# Patient Record
Sex: MALE | Race: WHITE | NOT HISPANIC OR LATINO | Employment: OTHER | URBAN - NONMETROPOLITAN AREA
[De-identification: names, ages, dates, MRNs, and addresses within clinical notes are randomized per-mention and may not be internally consistent; named-entity substitution may affect disease eponyms.]

---

## 2023-05-31 ENCOUNTER — APPOINTMENT (OUTPATIENT)
Dept: GENERAL RADIOLOGY | Facility: HOSPITAL | Age: 58
End: 2023-05-31
Payer: MEDICARE

## 2023-05-31 ENCOUNTER — APPOINTMENT (OUTPATIENT)
Dept: CT IMAGING | Facility: HOSPITAL | Age: 58
End: 2023-05-31
Payer: MEDICARE

## 2023-05-31 ENCOUNTER — HOSPITAL ENCOUNTER (EMERGENCY)
Facility: HOSPITAL | Age: 58
Discharge: HOME | End: 2023-05-31
Attending: EMERGENCY MEDICINE | Admitting: FAMILY MEDICINE
Payer: MEDICARE

## 2023-05-31 ENCOUNTER — APPOINTMENT (OUTPATIENT)
Dept: ULTRASOUND IMAGING | Facility: HOSPITAL | Age: 58
End: 2023-05-31
Payer: MEDICARE

## 2023-05-31 VITALS
WEIGHT: 196.6 LBS | OXYGEN SATURATION: 97 % | BODY MASS INDEX: 31.6 KG/M2 | SYSTOLIC BLOOD PRESSURE: 146 MMHG | TEMPERATURE: 98 F | HEART RATE: 88 BPM | DIASTOLIC BLOOD PRESSURE: 78 MMHG | HEIGHT: 66 IN | RESPIRATION RATE: 20 BRPM

## 2023-05-31 DIAGNOSIS — K80.20 CALCULUS OF GALLBLADDER WITHOUT CHOLECYSTITIS WITHOUT OBSTRUCTION: ICD-10-CM

## 2023-05-31 DIAGNOSIS — R10.11 RIGHT UPPER QUADRANT ABDOMINAL PAIN: Primary | ICD-10-CM

## 2023-05-31 DIAGNOSIS — K80.50 BILIARY COLIC: ICD-10-CM

## 2023-05-31 DIAGNOSIS — K82.8 GALLBLADDER SLUDGE: ICD-10-CM

## 2023-05-31 LAB
ALBUMIN SERPL-MCNC: 4.2 G/DL (ref 3.5–5.2)
ALBUMIN/GLOB SERPL: 1.8 G/DL
ALP SERPL-CCNC: 97 U/L (ref 39–117)
ALT SERPL W P-5'-P-CCNC: 15 U/L (ref 1–41)
ANION GAP SERPL CALCULATED.3IONS-SCNC: 11 MMOL/L (ref 5–15)
APTT PPP: 31.7 SECONDS (ref 24.1–35)
AST SERPL-CCNC: 17 U/L (ref 1–40)
BASOPHILS # BLD AUTO: 0.07 10*3/MM3 (ref 0–0.2)
BASOPHILS NFR BLD AUTO: 0.5 % (ref 0–1.5)
BILIRUB SERPL-MCNC: 0.7 MG/DL (ref 0–1.2)
BILIRUB UR QL STRIP: NEGATIVE
BUN SERPL-MCNC: 16 MG/DL (ref 6–20)
BUN/CREAT SERPL: 16.7 (ref 7–25)
CALCIUM SPEC-SCNC: 8.9 MG/DL (ref 8.6–10.5)
CHLORIDE SERPL-SCNC: 106 MMOL/L (ref 98–107)
CLARITY UR: CLEAR
CO2 SERPL-SCNC: 26 MMOL/L (ref 22–29)
COLOR UR: YELLOW
CREAT SERPL-MCNC: 0.96 MG/DL (ref 0.76–1.27)
D-LACTATE SERPL-SCNC: 2.4 MMOL/L (ref 0.5–2)
D-LACTATE SERPL-SCNC: 2.6 MMOL/L (ref 0.5–2)
DEPRECATED RDW RBC AUTO: 54.4 FL (ref 37–54)
EGFRCR SERPLBLD CKD-EPI 2021: 93.3 ML/MIN/1.73
EOSINOPHIL # BLD AUTO: 0.47 10*3/MM3 (ref 0–0.4)
EOSINOPHIL NFR BLD AUTO: 3 % (ref 0.3–6.2)
ERYTHROCYTE [DISTWIDTH] IN BLOOD BY AUTOMATED COUNT: 18.5 % (ref 12.3–15.4)
ETHANOL UR QL: 0.22 %
GEN 5 2HR TROPONIN T REFLEX: 29 NG/L
GLOBULIN UR ELPH-MCNC: 2.4 GM/DL
GLUCOSE SERPL-MCNC: 90 MG/DL (ref 65–99)
GLUCOSE UR STRIP-MCNC: NEGATIVE MG/DL
HCT VFR BLD AUTO: 41.5 % (ref 37.5–51)
HGB BLD-MCNC: 12.8 G/DL (ref 13–17.7)
HGB UR QL STRIP.AUTO: NEGATIVE
IMM GRANULOCYTES # BLD AUTO: 0.05 10*3/MM3 (ref 0–0.05)
IMM GRANULOCYTES NFR BLD AUTO: 0.3 % (ref 0–0.5)
INR PPP: 0.95 (ref 0.91–1.09)
KETONES UR QL STRIP: NEGATIVE
LEUKOCYTE ESTERASE UR QL STRIP.AUTO: NEGATIVE
LIPASE SERPL-CCNC: 18 U/L (ref 13–60)
LYMPHOCYTES # BLD AUTO: 2.06 10*3/MM3 (ref 0.7–3.1)
LYMPHOCYTES NFR BLD AUTO: 13.2 % (ref 19.6–45.3)
MCH RBC QN AUTO: 25.9 PG (ref 26.6–33)
MCHC RBC AUTO-ENTMCNC: 30.8 G/DL (ref 31.5–35.7)
MCV RBC AUTO: 83.8 FL (ref 79–97)
MONOCYTES # BLD AUTO: 1.02 10*3/MM3 (ref 0.1–0.9)
MONOCYTES NFR BLD AUTO: 6.6 % (ref 5–12)
NEUTROPHILS NFR BLD AUTO: 11.88 10*3/MM3 (ref 1.7–7)
NEUTROPHILS NFR BLD AUTO: 76.4 % (ref 42.7–76)
NITRITE UR QL STRIP: NEGATIVE
NRBC BLD AUTO-RTO: 0 /100 WBC (ref 0–0.2)
PH UR STRIP.AUTO: 5.5 [PH] (ref 5–8)
PLATELET # BLD AUTO: 344 10*3/MM3 (ref 140–450)
PMV BLD AUTO: 9.2 FL (ref 6–12)
POTASSIUM SERPL-SCNC: 4.3 MMOL/L (ref 3.5–5.2)
PROT SERPL-MCNC: 6.6 G/DL (ref 6–8.5)
PROT UR QL STRIP: NEGATIVE
PROTHROMBIN TIME: 12.7 SECONDS (ref 11.8–14.8)
RBC # BLD AUTO: 4.95 10*6/MM3 (ref 4.14–5.8)
SODIUM SERPL-SCNC: 143 MMOL/L (ref 136–145)
SP GR UR STRIP: 1.02 (ref 1–1.03)
TROPONIN T DELTA: -2 NG/L
TROPONIN T SERPL HS-MCNC: 31 NG/L
UROBILINOGEN UR QL STRIP: NORMAL
WBC NRBC COR # BLD: 15.55 10*3/MM3 (ref 3.4–10.8)

## 2023-05-31 PROCEDURE — 71045 X-RAY EXAM CHEST 1 VIEW: CPT

## 2023-05-31 PROCEDURE — 81003 URINALYSIS AUTO W/O SCOPE: CPT | Performed by: PHYSICIAN ASSISTANT

## 2023-05-31 PROCEDURE — 99284 EMERGENCY DEPT VISIT MOD MDM: CPT

## 2023-05-31 PROCEDURE — 84484 ASSAY OF TROPONIN QUANT: CPT | Performed by: PHYSICIAN ASSISTANT

## 2023-05-31 PROCEDURE — 25510000001 IOPAMIDOL 61 % SOLUTION: Performed by: PHYSICIAN ASSISTANT

## 2023-05-31 PROCEDURE — 83605 ASSAY OF LACTIC ACID: CPT | Performed by: PHYSICIAN ASSISTANT

## 2023-05-31 PROCEDURE — 85730 THROMBOPLASTIN TIME PARTIAL: CPT | Performed by: PHYSICIAN ASSISTANT

## 2023-05-31 PROCEDURE — 36415 COLL VENOUS BLD VENIPUNCTURE: CPT

## 2023-05-31 PROCEDURE — 83690 ASSAY OF LIPASE: CPT | Performed by: PHYSICIAN ASSISTANT

## 2023-05-31 PROCEDURE — 25010000002 THIAMINE PER 100 MG: Performed by: PHYSICIAN ASSISTANT

## 2023-05-31 PROCEDURE — 96365 THER/PROPH/DIAG IV INF INIT: CPT

## 2023-05-31 PROCEDURE — 85025 COMPLETE CBC W/AUTO DIFF WBC: CPT | Performed by: PHYSICIAN ASSISTANT

## 2023-05-31 PROCEDURE — 74177 CT ABD & PELVIS W/CONTRAST: CPT

## 2023-05-31 PROCEDURE — 76705 ECHO EXAM OF ABDOMEN: CPT

## 2023-05-31 PROCEDURE — 93005 ELECTROCARDIOGRAM TRACING: CPT | Performed by: PHYSICIAN ASSISTANT

## 2023-05-31 PROCEDURE — 96367 TX/PROPH/DG ADDL SEQ IV INF: CPT

## 2023-05-31 PROCEDURE — 96361 HYDRATE IV INFUSION ADD-ON: CPT

## 2023-05-31 PROCEDURE — 80053 COMPREHEN METABOLIC PANEL: CPT | Performed by: PHYSICIAN ASSISTANT

## 2023-05-31 PROCEDURE — 82077 ASSAY SPEC XCP UR&BREATH IA: CPT | Performed by: PHYSICIAN ASSISTANT

## 2023-05-31 PROCEDURE — 96375 TX/PRO/DX INJ NEW DRUG ADDON: CPT

## 2023-05-31 PROCEDURE — 85610 PROTHROMBIN TIME: CPT | Performed by: PHYSICIAN ASSISTANT

## 2023-05-31 RX ORDER — KETOROLAC TROMETHAMINE 10 MG/1
10 TABLET, FILM COATED ORAL EVERY 6 HOURS PRN
Qty: 9 TABLET | Refills: 0 | Status: SHIPPED | OUTPATIENT
Start: 2023-05-31

## 2023-05-31 RX ORDER — SODIUM CHLORIDE, SODIUM LACTATE, POTASSIUM CHLORIDE, CALCIUM CHLORIDE 600; 310; 30; 20 MG/100ML; MG/100ML; MG/100ML; MG/100ML
125 INJECTION, SOLUTION INTRAVENOUS CONTINUOUS
Status: DISCONTINUED | OUTPATIENT
Start: 2023-05-31 | End: 2023-05-31 | Stop reason: HOSPADM

## 2023-05-31 RX ORDER — SODIUM CHLORIDE 0.9 % (FLUSH) 0.9 %
10 SYRINGE (ML) INJECTION AS NEEDED
Status: DISCONTINUED | OUTPATIENT
Start: 2023-05-31 | End: 2023-05-31 | Stop reason: HOSPADM

## 2023-05-31 RX ORDER — FAMOTIDINE 10 MG/ML
20 INJECTION, SOLUTION INTRAVENOUS ONCE
Status: COMPLETED | OUTPATIENT
Start: 2023-05-31 | End: 2023-05-31

## 2023-05-31 RX ADMIN — THIAMINE HYDROCHLORIDE 100 MG: 100 INJECTION, SOLUTION INTRAMUSCULAR; INTRAVENOUS at 17:46

## 2023-05-31 RX ADMIN — FOLIC ACID 1 MG: 5 INJECTION, SOLUTION INTRAMUSCULAR; INTRAVENOUS; SUBCUTANEOUS at 18:07

## 2023-05-31 RX ADMIN — FAMOTIDINE 20 MG: 10 INJECTION INTRAVENOUS at 17:45

## 2023-05-31 RX ADMIN — SODIUM CHLORIDE, POTASSIUM CHLORIDE, SODIUM LACTATE AND CALCIUM CHLORIDE 125 ML/HR: 600; 310; 30; 20 INJECTION, SOLUTION INTRAVENOUS at 19:18

## 2023-05-31 RX ADMIN — IOPAMIDOL 100 ML: 612 INJECTION, SOLUTION INTRAVENOUS at 16:53

## 2023-05-31 RX ADMIN — SODIUM CHLORIDE, POTASSIUM CHLORIDE, SODIUM LACTATE AND CALCIUM CHLORIDE 1000 ML: 600; 310; 30; 20 INJECTION, SOLUTION INTRAVENOUS at 17:44

## 2023-05-31 NOTE — ED PROVIDER NOTES
"Subjective   History of Present Illness    Patient is a pleasant 57-year-old gentleman who presents to ED with wife.  Chief complaint is right-sided abdominal pain.  Wife does provide some history as well.  She describes that he is complained of discomfort in the right upper quadrant last evening.  Initially was somewhat tolerable.  He describes as \"it feels like something is trying to push out.\"  He denies experiencing pain like this before but he does experience chronic pain in other areas.  He denies any associated fever but does complain some nausea without any vomiting.  He reports he is an alcoholic and drinks regularly.  He drinks as much as he could afford.  He had at least a couple pints of rum today with the last alcohol intake an hour prior to ER arrival and after the pain had already began.  He denies any worsening pain with alcohol.  He denies any dysuria, hematuria, flank pain.  He denies any change in bowel movement.  His gallbladder is still intact.  He has had ulcers in the past and EGDs all presenting differently.  He also has a history of kidney stones that presented differently.  He denies any flank pain he has any trauma.    Patient does have slurred speech but the wife reports the patient has a history of stroke and had slurred speech since his stroke.  Also, he has been drinking alcohol which slurred speech.  She reports that this is normal and she is not surprised by this.  He denies any other neurological deficits.    Review of Systems   Constitutional:  Positive for activity change.   HENT: Negative.     Eyes: Negative.    Respiratory: Negative.  Negative for chest tightness.    Cardiovascular: Negative.    Gastrointestinal:  Positive for abdominal pain and nausea. Negative for constipation, diarrhea and vomiting.   Genitourinary: Negative.    Musculoskeletal: Negative.    Skin: Negative.    Neurological: Negative.    Psychiatric/Behavioral: Negative.     All other systems reviewed and are " "negative.    History reviewed. No pertinent past medical history.    Allergies   Allergen Reactions    Prozac [Fluoxetine] Unknown - Low Severity     Chest pain        Past Surgical History:   Procedure Laterality Date    HERNIA REPAIR         History reviewed. No pertinent family history.    Social History     Socioeconomic History    Marital status:    Substance and Sexual Activity    Alcohol use: Yes       Prior to Admission medications    Not on File       Medications   sodium chloride 0.9 % flush 10 mL (has no administration in time range)   thiamine (B-1) 100 mg in sodium chloride 0.9 % 100 mL IVPB (0 mg Intravenous Stopped 5/31/23 1807)   lactated ringers infusion (125 mL/hr Intravenous New Bag 5/31/23 1918)   folic acid 1 mg in sodium chloride 0.9 % 50 mL IVPB (0 mg Intravenous Stopped 5/31/23 1917)   famotidine (PEPCID) injection 20 mg (20 mg Intravenous Given 5/31/23 1745)   lactated ringers bolus 1,000 mL (0 mL Intravenous Stopped 5/31/23 1917)   iopamidol (ISOVUE-300) 61 % injection 100 mL (100 mL Intravenous Given 5/31/23 1653)       /78   Pulse 88   Temp 98 °F (36.7 °C)   Resp 20   Ht 167.6 cm (66\")   Wt 89.2 kg (196 lb 9.6 oz)   SpO2 97%   BMI 31.73 kg/m²       Objective   Physical Exam  Vitals reviewed.   Constitutional:       Appearance: He is well-developed.   HENT:      Head: Normocephalic and atraumatic.      Right Ear: External ear normal.      Left Ear: External ear normal.      Nose: Nose normal.   Eyes:      Conjunctiva/sclera: Conjunctivae normal.      Pupils: Pupils are equal, round, and reactive to light.   Neck:      Trachea: No tracheal deviation.   Cardiovascular:      Rate and Rhythm: Normal rate and regular rhythm.      Heart sounds: Normal heart sounds. No murmur heard.    No friction rub. No gallop.   Pulmonary:      Effort: Pulmonary effort is normal. No respiratory distress.      Breath sounds: Normal breath sounds. No wheezing or rales.   Chest:      Chest " wall: No tenderness.   Abdominal:      General: Bowel sounds are normal. There is no distension.      Palpations: Abdomen is soft. There is no mass.      Tenderness: There is no abdominal tenderness in the right upper quadrant. There is no guarding or rebound.   Musculoskeletal:         General: No tenderness or deformity. Normal range of motion.      Cervical back: Normal range of motion and neck supple.   Skin:     General: Skin is warm and dry.      Coloration: Skin is not pale.      Findings: No erythema or rash.   Neurological:      General: No focal deficit present.      Mental Status: He is alert and oriented to person, place, and time.      Cranial Nerves: Cranial nerves 2-12 are intact.      Sensory: Sensation is intact.      Motor: Motor function is intact.      Coordination: Coordination is intact.      Comments: Slurred speech   Psychiatric:         Mood and Affect: Mood normal.         Behavior: Behavior normal.         Thought Content: Thought content normal.         Judgment: Judgment normal.       Procedures         Lab Results (last 24 hours)       Procedure Component Value Units Date/Time    CBC & Differential [[id number]]  (Abnormal) Collected: 05/31/23 1647    Specimen: Blood Updated: 05/31/23 1654    Narrative:      The following orders were created for panel order CBC & Differential.  Procedure                               Abnormality         Status                     ---------                               -----------         ------                     CBC Auto Differential[[id number]]        Abnormal            Final result                 Please view results for these tests on the individual orders.    Comprehensive Metabolic Panel [[id number]] Collected: 05/31/23 1647    Specimen: Blood Updated: 05/31/23 1714     Glucose 90 mg/dL      BUN 16 mg/dL      Creatinine 0.96 mg/dL      Sodium 143 mmol/L      Potassium 4.3 mmol/L      Comment: Slight hemolysis detected by analyzer. Results may be  affected.        Chloride 106 mmol/L      CO2 26.0 mmol/L      Calcium 8.9 mg/dL      Total Protein 6.6 g/dL      Albumin 4.2 g/dL      ALT (SGPT) 15 U/L      AST (SGOT) 17 U/L      Alkaline Phosphatase 97 U/L      Total Bilirubin 0.7 mg/dL      Globulin 2.4 gm/dL      A/G Ratio 1.8 g/dL      BUN/Creatinine Ratio 16.7     Anion Gap 11.0 mmol/L      eGFR 93.3 mL/min/1.73     Narrative:      GFR Normal >60  Chronic Kidney Disease <60  Kidney Failure <15      Lipase [[id number]]  (Normal) Collected: 05/31/23 1647    Specimen: Blood Updated: 05/31/23 1710     Lipase 18 U/L     aPTT [[id number]]  (Normal) Collected: 05/31/23 1647    Specimen: Blood Updated: 05/31/23 1704     PTT 31.7 seconds     Protime-INR [[id number]]  (Normal) Collected: 05/31/23 1647    Specimen: Blood Updated: 05/31/23 1704     Protime 12.7 Seconds      INR 0.95    Lactic Acid, Plasma [[id number]]  (Abnormal) Collected: 05/31/23 1647    Specimen: Blood Updated: 05/31/23 1719     Lactate 2.4 mmol/L     Ethanol [[id number]] Collected: 05/31/23 1647    Specimen: Blood Updated: 05/31/23 1710     Ethanol % 0.216 %     Narrative:      Not for legal purposes. Chain of Custody not followed.     CBC Auto Differential [[id number]]  (Abnormal) Collected: 05/31/23 1647    Specimen: Blood Updated: 05/31/23 1654     WBC 15.55 10*3/mm3      RBC 4.95 10*6/mm3      Hemoglobin 12.8 g/dL      Hematocrit 41.5 %      MCV 83.8 fL      MCH 25.9 pg      MCHC 30.8 g/dL      RDW 18.5 %      RDW-SD 54.4 fl      MPV 9.2 fL      Platelets 344 10*3/mm3      Neutrophil % 76.4 %      Lymphocyte % 13.2 %      Monocyte % 6.6 %      Eosinophil % 3.0 %      Basophil % 0.5 %      Immature Grans % 0.3 %      Neutrophils, Absolute 11.88 10*3/mm3      Lymphocytes, Absolute 2.06 10*3/mm3      Monocytes, Absolute 1.02 10*3/mm3      Eosinophils, Absolute 0.47 10*3/mm3      Basophils, Absolute 0.07 10*3/mm3      Immature Grans, Absolute 0.05 10*3/mm3      nRBC 0.0 /100 WBC     High Sensitivity  Troponin T [[id number]]  (Abnormal) Collected: 05/31/23 1647    Specimen: Blood Updated: 05/31/23 1712     HS Troponin T 31 ng/L     Narrative:      High Sensitive Troponin T Reference Range:  <10.0 ng/L- Negative Female for AMI  <15.0 ng/L- Negative Male for AMI  >=10 - Abnormal Female indicating possible myocardial injury.  >=15 - Abnormal Male indicating possible myocardial injury.   Clinicians would have to utilize clinical acumen, EKG, Troponin, and serial changes to determine if it is an Acute Myocardial Infarction or myocardial injury due to an underlying chronic condition.         Urinalysis With Culture If Indicated - Urine, Clean Catch [[id number]]  (Normal) Collected: 05/31/23 1712    Specimen: Urine, Clean Catch Updated: 05/31/23 1724     Color, UA Yellow     Appearance, UA Clear     pH, UA 5.5     Specific Gravity, UA 1.019     Glucose, UA Negative     Ketones, UA Negative     Bilirubin, UA Negative     Blood, UA Negative     Protein, UA Negative     Leuk Esterase, UA Negative     Nitrite, UA Negative     Urobilinogen, UA 1.0 E.U./dL    Narrative:      In absence of clinical symptoms, the presence of pyuria, bacteria, and/or nitrites on the urinalysis result does not correlate with infection.  Urine microscopic not indicated.    High Sensitivity Troponin T 2Hr [[id number]]  (Abnormal) Collected: 05/31/23 1913    Specimen: Blood Updated: 05/31/23 1942     HS Troponin T 29 ng/L      Troponin T Delta -2 ng/L     Narrative:      High Sensitive Troponin T Reference Range:  <10.0 ng/L- Negative Female for AMI  <15.0 ng/L- Negative Male for AMI  >=10 - Abnormal Female indicating possible myocardial injury.  >=15 - Abnormal Male indicating possible myocardial injury.   Clinicians would have to utilize clinical acumen, EKG, Troponin, and serial changes to determine if it is an Acute Myocardial Infarction or myocardial injury due to an underlying chronic condition.         STAT Lactic Acid, Reflex [[id number]]   (Abnormal) Collected: 05/31/23 1914    Specimen: Blood Updated: 05/31/23 1959     Lactate 2.6 mmol/L             CT Abdomen Pelvis With Contrast    Result Date: 5/31/2023  Narrative: CT ABDOMEN PELVIS W CONTRAST- 5/31/2023 4:45 PM CDT  HISTORY: severe right sided ab pain  COMPARISON: None  DOSE LENGTH PRODUCT: 398 mGy cm. Automated exposure control was also utilized to decrease patient radiation dose.  TECHNIQUE: Axial images of the abdomen and pelvis are performed following IV contrast  FINDINGS:  There is no suspicious focal liver or splenic mass. 10 mm peripheral low-density right hepatic lesion image 22 may represent small hemangioma. Nonemergent liver ultrasound recommended for further characterization. No pancreatic cyst or mass. Low density focus within the tail of the pancreas (axial image 19) corresponds to volume averaging artifact with regional fat on the coronal reconstructed images. No prominent peripancreatic inflammation. No peripancreatic fluid collection. Gallbladder is unremarkable. No biliary dilatation. No adrenal nodule. No enhancing renal mass. Mild inferior positioning of the right kidney, developmental. No hydronephrosis. Streak artifact in the pelvis from bilateral hip prosthesis. Bladder is unremarkable. Moderate left and small right fatty containing hernias.  No free intraperitoneal air loculated abscess. Normal appendix. No bowel obstruction. Postoperative changes of the stomach. Moderate size hiatal hernia.  No pathologic lymphadenopathy. Patent normal caliber abdominal aorta.  Minimal right basilar atelectasis.  No aggressive regional bony lesions. Mild chronic appearing impression of superior plate of L4. Bilateral hip prosthesis.      Impression: 1. 10 mm low-density peripheral right hepatic lesion may represent an incidental cavernous hemangioma. If no outside studies to document stability, a nonemergent follow-up liver ultrasound recommended for further characterization. 2. Slightly  low positioning right kidney within the retroperitoneum representing developmental finding. 3. Postoperative changes stomach with moderate size hiatal hernia. 4. Moderate-sized right and small left fatty containing inguinal hernias. 5. No acute inflammatory process seen within the abdomen or pelvis. This report was finalized on 05/31/2023 17:21 by Dr. Natasha Briggs MD.    US Gallbladder    Result Date: 5/31/2023  Narrative: EXAMINATION: US GALLBLADDER- 5/31/2023 5:39 PM CDT  HISTORY: RUQ abdominal pain.  REPORT: Sonographic images of the gallbladder/right upper quadrant were obtained.  COMPARISON: CT abdomen pelvis 05/21/2023.  The technologist reports that the examination is technically limited due to extensive bowel gas and the patient is unable to cooperate with breath-holding.  There is poor visualization of the pancreas. The proximal aorta is obscured. The proximal IVC appears normal in caliber. The visualized liver parenchyma is homogeneous. A small 10 mm lesion seen on CT in the right hepatic lobe is not visualized. The common hepatic duct measures 5.3 mm in diameter, normal. Dependent sludge and small stones are suspected within the gallbladder. No bladder wall thickness equals 3 mm. No pericholecystic fluid is identified. There is no evidence of acute cholecystitis. Color Doppler images demonstrate patency of the portal vein, with normal flow direction.      Impression: 1. Sludge and small gallstones are noted within the gallbladder without evidence of acute cholecystitis. No biliary ductal dilatation is identified. The study is limited due to excessive bowel gas and the patient's inability to cooperate with breath-holding. This report was finalized on 05/31/2023 17:42 by Dr. Bijan Marcelino MD.    XR Chest 1 View    Result Date: 5/31/2023  Narrative: EXAMINATION: XR CHEST 1 VW- 5/31/2023 5:54 PM CDT  HISTORY: upper abdominal pain.  REPORT: A frontal view of the chest was obtained.  COMPARISON: There are  no correlative imaging studies for comparison.   The lungs are hypoaerated, no focal infiltrate or pulmonary consolidation is identified. No pneumothorax or effusion is identified. Heart size is normal. The osseous structures show no acute findings. There is evidence of previous right total reverse shoulder arthroplasty.      Impression: Shallow inspiration, no acute cardiopulmonary abnormality.  This report was finalized on 05/31/2023 17:54 by Dr. Bijan Marcelino MD.     ED Course  ED Course as of 05/31/23 2014   Wed May 31, 2023   2008 Upon reassessment, the patient reports feeling much better.  They feel comfortable follow-up with Dr. Mays, general surgeon on-call as an outpatient basis.  Educated this could be biliary colic as the patient does have evidence of gallbladder sludge and stones but there is no evidence of acute cholecystitis, choledocholithiasis, or any other further surgical complications.  Patient's cardiac work-up showed his troponin T trended downward from 31-29 with a -2 delta.  Lactate decreased from 2.6-2.4 as well.  Information for Dr. Talamantes will be given.  Strict return precautions advised.  Patient will be discharged in stable condition. [TK]   2009 Patient did had leukocytosis but that could be reactive from the pain and/or nausea or vomiting.  He denies any fever. [TK]   2012 Patient does ask for pain medicine  togo home with.  He did not get any here because he is intoxicated.  I have educated that I wlll prescribe Toradol but I do not want him to drink with this and to eat food prior to taking Toradol.  Patient voiced understanding.  Wife confirms this as well.  He will be discharged in stable condition. [TK]      ED Course User Index  [TK] Bernabe Mcnulty, PA          [facility]      Final diagnoses:   Right upper quadrant abdominal pain   Gallbladder sludge   Calculus of gallbladder without cholecystitis without obstruction   Biliary colic          Bernabe Mcnulty,  PA  05/31/23 2011       Bernabe Mcnulty PA  05/31/23 2014

## 2023-06-02 LAB
QT INTERVAL: 388 MS
QT INTERVAL: 398 MS
QTC INTERVAL: 487 MS
QTC INTERVAL: 494 MS

## 2023-06-14 ENCOUNTER — OFFICE VISIT (OUTPATIENT)
Dept: SURGERY | Facility: CLINIC | Age: 58
End: 2023-06-14
Payer: MEDICARE

## 2023-06-14 VITALS
BODY MASS INDEX: 31.6 KG/M2 | SYSTOLIC BLOOD PRESSURE: 146 MMHG | HEART RATE: 88 BPM | WEIGHT: 196.65 LBS | DIASTOLIC BLOOD PRESSURE: 78 MMHG | HEIGHT: 66 IN

## 2023-06-14 DIAGNOSIS — M15.9 PRIMARY OSTEOARTHRITIS INVOLVING MULTIPLE JOINTS: ICD-10-CM

## 2023-06-14 DIAGNOSIS — K80.12 CALCULUS OF GALLBLADDER WITH ACUTE ON CHRONIC CHOLECYSTITIS WITHOUT OBSTRUCTION: Primary | ICD-10-CM

## 2023-06-14 DIAGNOSIS — I63.132 CEREBRAL INFARCTION DUE TO EMBOLISM OF LEFT CAROTID ARTERY: ICD-10-CM

## 2023-06-14 DIAGNOSIS — G45.9 TIA (TRANSIENT ISCHEMIC ATTACK): ICD-10-CM

## 2023-06-14 PROBLEM — M15.0 PRIMARY OSTEOARTHRITIS INVOLVING MULTIPLE JOINTS: Status: ACTIVE | Noted: 2023-06-14

## 2023-06-14 RX ORDER — ONDANSETRON 2 MG/ML
4 INJECTION INTRAMUSCULAR; INTRAVENOUS EVERY 6 HOURS PRN
OUTPATIENT
Start: 2023-06-14

## 2023-06-14 RX ORDER — SODIUM CHLORIDE 9 MG/ML
100 INJECTION, SOLUTION INTRAVENOUS CONTINUOUS
OUTPATIENT
Start: 2023-06-14

## 2023-06-14 NOTE — PATIENT INSTRUCTIONS
Very nice to meet you Mr. Up, we will have your preoperative work-up completed, also evaluation of your carotid arteries, we will look toward surgery on 20 June for laparoscopic cholecystectomy I will see you at that time and will be a same-day surgery and you can go home after this.  Surgery; 06/20/23 arrive at 6:30 am  Pre-work; 06/15/23 arrive at 1:45 pm  (No fasting required this day)  NPO after midnight the night before surgery. You may take routine medications with a sip of water the morning of surgery.  Check in at the main registration desk located at the main entrance of the hospital on both days.

## 2023-06-15 ENCOUNTER — TELEPHONE (OUTPATIENT)
Dept: SURGERY | Facility: CLINIC | Age: 58
End: 2023-06-15
Payer: MEDICARE

## 2023-06-16 NOTE — PROGRESS NOTES
Patient: Riley Up    YOB: 1965    Date: 06/14/2023    Primary Care Provider: Provider, No Known    Chief Complaint   Patient presents with    Cholelithiasis     Mr. Up is here for a consult for his gallbladder.       Subjective .     History of present illness:  Mr. Up is a 57 y.o. who is here for evaluation of increasingly symptomatic cholelithiasis.  He is noted to have right-sided discomfort and presented to the emergency room recently with this abdominal pain CT scan was completed showing question of stones but ultrasound showing sludge and small gallstones noted by ultrasound on 31 May.  With these increasingly symptomatic stones he desires to have surgical intervention and plan laparoscopic cholecystectomy and treatment the above problem on 20 June.    Past surgical history significant for gastric sleeve in the past, bilateral hip replacement bilateral knee reconstruction, umbilical hernia repair right shoulder replacement.    Medications inhaler and Singulair.  Previous TIAs in the past.  Allergies to Prozac.    Review of systems positive for reading glasses, pneumonia, asthma, abnormal rhythm mini stroke nausea vomiting heartburn chronic back pain osteoarthritis and headaches.    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    .    The following portions of the patient's history were reviewed and updated as appropriate: allergies, current medications, past family history, past medical history, past social history, past surgical history and problem list.    Review of Systems    History:  History reviewed. No pertinent past medical history.       Past Surgical History:   Procedure Laterality Date    HERNIA REPAIR         History reviewed. No pertinent family history.    Social History     Substance Use Topics    Alcohol use: Yes       Allergies:  Allergies   Allergen Reactions    Prozac [Fluoxetine] Unknown - Low Severity     Chest pain  "       Medications:     Current Outpatient Medications:     ketorolac (TORADOL) 10 MG tablet, Take 1 tablet by mouth Every 6 (Six) Hours As Needed for Moderate Pain., Disp: 9 tablet, Rfl: 0    Objective     Vital Signs:   Vitals:    06/14/23 1020   BP: 146/78   BP Location: Left arm   Patient Position: Sitting   Cuff Size: Adult   Pulse: 88   Weight: 89.2 kg (196 lb 10.4 oz)   Height: 167.6 cm (65.98\")       Physical Exam:     General Appearance:    Alert, cooperative, in no acute distress   Head:    Normocephalic, without obvious abnormality, atraumatic   Eyes:            Lids and lashes normal, conjunctivae and sclerae normal, no   icterus, no pallor, corneas clear,   Ears:    Ears appear intact with no abnormalities noted   Throat:   No oral lesions, no thrush, oral mucosa moist       Lungs:     Clear to auscultation,respirations regular, even and                  Unlabored    Heart:    Regular rhythm and normal rate, no murmur, no gallop.   Chest Wall:    No abnormalities observed   Abdomen:     Normal bowel sounds, no masses, no organomegaly, soft        non-tender, non-distended, no guarding.   Extremities:   Moves all extremities well, no edema, no cyanosis, no             redness   Pulses:   Pulses palpable and equal bilaterally   Skin:   No bleeding, bruising or rash   Lymph nodes:   No palpable adenopathy   Neurologic:   Cranial nerves 2 - 12 grossly intact.         Results Review:   I reviewed the patient's new clinical results.    Review of Systems was reviewed and confirmed as accurate as documented by the MA.    BMI is >= 30 and <35. (Class 1 Obesity). The following options were offered after discussion;: weight loss educational material (shared in after visit summary)    Assessment / Plan:    Diagnoses and all orders for this visit:    1. Calculus of gallbladder with acute on chronic cholecystitis without obstruction (Primary)  -     Case Request; Standing  -     sodium chloride 0.9 % infusion  -     " ondansetron (ZOFRAN) injection 4 mg  -     Case Request    2. TIA (transient ischemic attack)  -     US Carotid Bilateral; Future    3. Primary osteoarthritis involving multiple joints    4. Cerebral infarction due to embolism of left carotid artery  -     US Carotid Bilateral; Future    Other orders  -     Follow Anesthesia Guidelines / Protocol; Future  -     Follow Anesthesia Guidelines / Protocol; Standing  -     Verify / Perform Chlorhexidine Skin Prep; Standing  -     Obtain Informed Consent; Future  -     Provide NPO Instructions to Patient; Future  -     Chlorhexidine Skin Prep; Future  -     Initiate Observation Status; Standing      57-year-old gentleman with increasing the symptomatic cholelithiasis plan for laparoscopic exploration cholecystectomy on 20 June.  Risk and benefits discussed with full knowledge of this and apparent understanding he gives his informed consent for surgery.    [] X-Ray  [] Reviewed Records  [] Called Physician/Consulted    Electronically signed by David Talamantes MD  06/16/23  15:23 CDT

## 2023-09-20 ENCOUNTER — TRANSCRIBE ORDERS (OUTPATIENT)
Dept: ADMINISTRATIVE | Facility: HOSPITAL | Age: 58
End: 2023-09-20
Payer: MEDICARE

## 2023-09-20 ENCOUNTER — LAB (OUTPATIENT)
Dept: LAB | Facility: HOSPITAL | Age: 58
End: 2023-09-20
Payer: MEDICARE

## 2023-09-20 ENCOUNTER — HOSPITAL ENCOUNTER (OUTPATIENT)
Dept: CARDIOLOGY | Facility: HOSPITAL | Age: 58
Discharge: HOME | End: 2023-09-20
Payer: MEDICARE

## 2023-09-20 DIAGNOSIS — J45.909 ALLERGIC ASTHMA WITH STATED CAUSE: ICD-10-CM

## 2023-09-20 DIAGNOSIS — K21.00 GASTROESOPHAGEAL REFLUX DISEASE WITH ESOPHAGITIS WITHOUT HEMORRHAGE: Primary | ICD-10-CM

## 2023-09-20 DIAGNOSIS — M54.50 LOW BACK PAIN, UNSPECIFIED BACK PAIN LATERALITY, UNSPECIFIED CHRONICITY, UNSPECIFIED WHETHER SCIATICA PRESENT: ICD-10-CM

## 2023-09-20 DIAGNOSIS — K21.00 GASTROESOPHAGEAL REFLUX DISEASE WITH ESOPHAGITIS WITHOUT HEMORRHAGE: ICD-10-CM

## 2023-09-20 DIAGNOSIS — M13.80: ICD-10-CM

## 2023-09-20 DIAGNOSIS — J30.2 SEASONAL ALLERGIES: ICD-10-CM

## 2023-09-20 DIAGNOSIS — J44.9 CHRONIC OBSTRUCTIVE PULMONARY DISEASE, UNSPECIFIED COPD TYPE: ICD-10-CM

## 2023-09-20 DIAGNOSIS — R00.2 PALPITATIONS: Primary | ICD-10-CM

## 2023-09-20 DIAGNOSIS — R00.2 PALPITATIONS: ICD-10-CM

## 2023-09-20 LAB
25(OH)D3 SERPL-MCNC: 11.2 NG/ML (ref 30–100)
ALBUMIN SERPL-MCNC: 3.9 G/DL (ref 3.5–5.2)
ALBUMIN/GLOB SERPL: 1.8 G/DL
ALP SERPL-CCNC: 80 U/L (ref 39–117)
ALT SERPL W P-5'-P-CCNC: 14 U/L (ref 1–41)
ANION GAP SERPL CALCULATED.3IONS-SCNC: 9 MMOL/L (ref 5–15)
AST SERPL-CCNC: 17 U/L (ref 1–40)
BASOPHILS # BLD AUTO: 0.09 10*3/MM3 (ref 0–0.2)
BASOPHILS NFR BLD AUTO: 1.3 % (ref 0–1.5)
BILIRUB SERPL-MCNC: 0.9 MG/DL (ref 0–1.2)
BUN SERPL-MCNC: 20 MG/DL (ref 6–20)
BUN/CREAT SERPL: 29 (ref 7–25)
CALCIUM SPEC-SCNC: 8.9 MG/DL (ref 8.6–10.5)
CHLORIDE SERPL-SCNC: 104 MMOL/L (ref 98–107)
CHOLEST SERPL-MCNC: 153 MG/DL (ref 0–200)
CO2 SERPL-SCNC: 29 MMOL/L (ref 22–29)
CREAT SERPL-MCNC: 0.69 MG/DL (ref 0.76–1.27)
DEPRECATED RDW RBC AUTO: 50.4 FL (ref 37–54)
EGFRCR SERPLBLD CKD-EPI 2021: 108.6 ML/MIN/1.73
EOSINOPHIL # BLD AUTO: 1.05 10*3/MM3 (ref 0–0.4)
EOSINOPHIL NFR BLD AUTO: 15 % (ref 0.3–6.2)
ERYTHROCYTE [DISTWIDTH] IN BLOOD BY AUTOMATED COUNT: 16 % (ref 12.3–15.4)
GLOBULIN UR ELPH-MCNC: 2.2 GM/DL
GLUCOSE SERPL-MCNC: 93 MG/DL (ref 65–99)
HCT VFR BLD AUTO: 41.2 % (ref 37.5–51)
HDLC SERPL-MCNC: 66 MG/DL (ref 40–60)
HGB BLD-MCNC: 12.4 G/DL (ref 13–17.7)
IMM GRANULOCYTES # BLD AUTO: 0.01 10*3/MM3 (ref 0–0.05)
IMM GRANULOCYTES NFR BLD AUTO: 0.1 % (ref 0–0.5)
LDLC SERPL CALC-MCNC: 76 MG/DL (ref 0–100)
LDLC/HDLC SERPL: 1.15 {RATIO}
LYMPHOCYTES # BLD AUTO: 1.57 10*3/MM3 (ref 0.7–3.1)
LYMPHOCYTES NFR BLD AUTO: 22.4 % (ref 19.6–45.3)
MCH RBC QN AUTO: 25.9 PG (ref 26.6–33)
MCHC RBC AUTO-ENTMCNC: 30.1 G/DL (ref 31.5–35.7)
MCV RBC AUTO: 86 FL (ref 79–97)
MONOCYTES # BLD AUTO: 0.57 10*3/MM3 (ref 0.1–0.9)
MONOCYTES NFR BLD AUTO: 8.1 % (ref 5–12)
NEUTROPHILS NFR BLD AUTO: 3.73 10*3/MM3 (ref 1.7–7)
NEUTROPHILS NFR BLD AUTO: 53.1 % (ref 42.7–76)
NRBC BLD AUTO-RTO: 0 /100 WBC (ref 0–0.2)
PLATELET # BLD AUTO: 295 10*3/MM3 (ref 140–450)
PMV BLD AUTO: 9.9 FL (ref 6–12)
POTASSIUM SERPL-SCNC: 3.9 MMOL/L (ref 3.5–5.2)
PROT SERPL-MCNC: 6.1 G/DL (ref 6–8.5)
RBC # BLD AUTO: 4.79 10*6/MM3 (ref 4.14–5.8)
SODIUM SERPL-SCNC: 142 MMOL/L (ref 136–145)
TRIGL SERPL-MCNC: 55 MG/DL (ref 0–150)
TSH SERPL DL<=0.05 MIU/L-ACNC: 0.44 UIU/ML (ref 0.27–4.2)
VIT B12 BLD-MCNC: 231 PG/ML (ref 211–946)
VLDLC SERPL-MCNC: 11 MG/DL (ref 5–40)
WBC NRBC COR # BLD: 7.02 10*3/MM3 (ref 3.4–10.8)

## 2023-09-20 PROCEDURE — 82607 VITAMIN B-12: CPT

## 2023-09-20 PROCEDURE — 93226 XTRNL ECG REC<48 HR SCAN A/R: CPT

## 2023-09-20 PROCEDURE — 84443 ASSAY THYROID STIM HORMONE: CPT

## 2023-09-20 PROCEDURE — 80053 COMPREHEN METABOLIC PANEL: CPT

## 2023-09-20 PROCEDURE — 85025 COMPLETE CBC W/AUTO DIFF WBC: CPT

## 2023-09-20 PROCEDURE — 93225 XTRNL ECG REC<48 HRS REC: CPT

## 2023-09-20 PROCEDURE — 82306 VITAMIN D 25 HYDROXY: CPT

## 2023-09-20 PROCEDURE — 36415 COLL VENOUS BLD VENIPUNCTURE: CPT

## 2023-09-20 PROCEDURE — 80061 LIPID PANEL: CPT

## 2023-09-29 ENCOUNTER — HOSPITAL ENCOUNTER (EMERGENCY)
Facility: HOSPITAL | Age: 58
Discharge: HOME | End: 2023-09-30
Attending: EMERGENCY MEDICINE
Payer: MEDICARE

## 2023-09-29 ENCOUNTER — APPOINTMENT (OUTPATIENT)
Dept: CT IMAGING | Facility: HOSPITAL | Age: 58
End: 2023-09-29
Payer: MEDICARE

## 2023-09-29 DIAGNOSIS — R10.9 ABDOMINAL PAIN, UNSPECIFIED ABDOMINAL LOCATION: Primary | ICD-10-CM

## 2023-09-29 LAB
ALBUMIN SERPL-MCNC: 4 G/DL (ref 3.5–5.2)
ALBUMIN/GLOB SERPL: 1.9 G/DL
ALP SERPL-CCNC: 75 U/L (ref 39–117)
ALT SERPL W P-5'-P-CCNC: 39 U/L (ref 1–41)
ANION GAP SERPL CALCULATED.3IONS-SCNC: 10 MMOL/L (ref 5–15)
AST SERPL-CCNC: 37 U/L (ref 1–40)
BASOPHILS # BLD AUTO: 0.06 10*3/MM3 (ref 0–0.2)
BASOPHILS NFR BLD AUTO: 1 % (ref 0–1.5)
BILIRUB SERPL-MCNC: 0.7 MG/DL (ref 0–1.2)
BILIRUB UR QL STRIP: NEGATIVE
BUN SERPL-MCNC: 18 MG/DL (ref 6–20)
BUN/CREAT SERPL: 20.9 (ref 7–25)
CALCIUM SPEC-SCNC: 8.3 MG/DL (ref 8.6–10.5)
CHLORIDE SERPL-SCNC: 107 MMOL/L (ref 98–107)
CLARITY UR: CLEAR
CO2 SERPL-SCNC: 27 MMOL/L (ref 22–29)
COLOR UR: ABNORMAL
CREAT SERPL-MCNC: 0.86 MG/DL (ref 0.76–1.27)
DEPRECATED RDW RBC AUTO: 50 FL (ref 37–54)
EGFRCR SERPLBLD CKD-EPI 2021: 101.6 ML/MIN/1.73
EOSINOPHIL # BLD AUTO: 0.6 10*3/MM3 (ref 0–0.4)
EOSINOPHIL NFR BLD AUTO: 10.1 % (ref 0.3–6.2)
ERYTHROCYTE [DISTWIDTH] IN BLOOD BY AUTOMATED COUNT: 16.1 % (ref 12.3–15.4)
GLOBULIN UR ELPH-MCNC: 2.1 GM/DL
GLUCOSE SERPL-MCNC: 87 MG/DL (ref 65–99)
GLUCOSE UR STRIP-MCNC: NEGATIVE MG/DL
HCT VFR BLD AUTO: 38.5 % (ref 37.5–51)
HGB BLD-MCNC: 11.7 G/DL (ref 13–17.7)
HGB UR QL STRIP.AUTO: NEGATIVE
IMM GRANULOCYTES # BLD AUTO: 0.01 10*3/MM3 (ref 0–0.05)
IMM GRANULOCYTES NFR BLD AUTO: 0.2 % (ref 0–0.5)
KETONES UR QL STRIP: ABNORMAL
LEUKOCYTE ESTERASE UR QL STRIP.AUTO: NEGATIVE
LIPASE SERPL-CCNC: 19 U/L (ref 13–60)
LYMPHOCYTES # BLD AUTO: 1.76 10*3/MM3 (ref 0.7–3.1)
LYMPHOCYTES NFR BLD AUTO: 29.6 % (ref 19.6–45.3)
MCH RBC QN AUTO: 25.6 PG (ref 26.6–33)
MCHC RBC AUTO-ENTMCNC: 30.4 G/DL (ref 31.5–35.7)
MCV RBC AUTO: 84.2 FL (ref 79–97)
MONOCYTES # BLD AUTO: 0.66 10*3/MM3 (ref 0.1–0.9)
MONOCYTES NFR BLD AUTO: 11.1 % (ref 5–12)
NEUTROPHILS NFR BLD AUTO: 2.86 10*3/MM3 (ref 1.7–7)
NEUTROPHILS NFR BLD AUTO: 48 % (ref 42.7–76)
NITRITE UR QL STRIP: NEGATIVE
NRBC BLD AUTO-RTO: 0 /100 WBC (ref 0–0.2)
PH UR STRIP.AUTO: 5.5 [PH] (ref 5–8)
PLATELET # BLD AUTO: 303 10*3/MM3 (ref 140–450)
PMV BLD AUTO: 9.5 FL (ref 6–12)
POTASSIUM SERPL-SCNC: 3.9 MMOL/L (ref 3.5–5.2)
PROT SERPL-MCNC: 6.1 G/DL (ref 6–8.5)
PROT UR QL STRIP: ABNORMAL
RBC # BLD AUTO: 4.57 10*6/MM3 (ref 4.14–5.8)
SODIUM SERPL-SCNC: 144 MMOL/L (ref 136–145)
SP GR UR STRIP: >1.03 (ref 1–1.03)
UROBILINOGEN UR QL STRIP: ABNORMAL
WBC NRBC COR # BLD: 5.95 10*3/MM3 (ref 3.4–10.8)

## 2023-09-29 PROCEDURE — P9612 CATHETERIZE FOR URINE SPEC: HCPCS

## 2023-09-29 PROCEDURE — 96374 THER/PROPH/DIAG INJ IV PUSH: CPT

## 2023-09-29 PROCEDURE — 99285 EMERGENCY DEPT VISIT HI MDM: CPT

## 2023-09-29 PROCEDURE — 85025 COMPLETE CBC W/AUTO DIFF WBC: CPT

## 2023-09-29 PROCEDURE — 80053 COMPREHEN METABOLIC PANEL: CPT

## 2023-09-29 PROCEDURE — 74177 CT ABD & PELVIS W/CONTRAST: CPT

## 2023-09-29 PROCEDURE — 25010000002 ONDANSETRON PER 1 MG

## 2023-09-29 PROCEDURE — 96375 TX/PRO/DX INJ NEW DRUG ADDON: CPT

## 2023-09-29 PROCEDURE — 25510000001 IOPAMIDOL 61 % SOLUTION: Performed by: EMERGENCY MEDICINE

## 2023-09-29 PROCEDURE — 83690 ASSAY OF LIPASE: CPT

## 2023-09-29 PROCEDURE — 25010000002 MORPHINE PER 10 MG: Performed by: EMERGENCY MEDICINE

## 2023-09-29 PROCEDURE — 81003 URINALYSIS AUTO W/O SCOPE: CPT

## 2023-09-29 RX ORDER — SODIUM CHLORIDE 0.9 % (FLUSH) 0.9 %
10 SYRINGE (ML) INJECTION AS NEEDED
Status: DISCONTINUED | OUTPATIENT
Start: 2023-09-29 | End: 2023-09-30 | Stop reason: HOSPADM

## 2023-09-29 RX ORDER — ONDANSETRON 2 MG/ML
4 INJECTION INTRAMUSCULAR; INTRAVENOUS ONCE
Status: COMPLETED | OUTPATIENT
Start: 2023-09-29 | End: 2023-09-29

## 2023-09-29 RX ADMIN — ONDANSETRON 4 MG: 2 INJECTION INTRAMUSCULAR; INTRAVENOUS at 22:13

## 2023-09-29 RX ADMIN — SODIUM CHLORIDE, POTASSIUM CHLORIDE, SODIUM LACTATE AND CALCIUM CHLORIDE 1000 ML: 600; 310; 30; 20 INJECTION, SOLUTION INTRAVENOUS at 22:17

## 2023-09-29 RX ADMIN — IOPAMIDOL 100 ML: 612 INJECTION, SOLUTION INTRAVENOUS at 22:50

## 2023-09-29 RX ADMIN — MORPHINE SULFATE 4 MG: 4 INJECTION, SOLUTION INTRAMUSCULAR; INTRAVENOUS at 22:13

## 2023-09-30 VITALS
WEIGHT: 230 LBS | TEMPERATURE: 98.3 F | SYSTOLIC BLOOD PRESSURE: 148 MMHG | HEIGHT: 66 IN | OXYGEN SATURATION: 93 % | RESPIRATION RATE: 18 BRPM | HEART RATE: 80 BPM | BODY MASS INDEX: 36.96 KG/M2 | DIASTOLIC BLOOD PRESSURE: 96 MMHG

## 2023-09-30 RX ORDER — DICYCLOMINE HCL 20 MG
20 TABLET ORAL EVERY 8 HOURS PRN
Qty: 10 TABLET | Refills: 0 | Status: SHIPPED | OUTPATIENT
Start: 2023-09-30

## 2023-09-30 NOTE — ED PROVIDER NOTES
Subjective   History of Present Illness  Patient is a 58-year-old male that presents to the ER with complaints of right upper quadrant pain that has been ongoing but it got worse tonight.  Patient denies nausea/vomiting, denies diarrhea, denies constipation.  Patient states he was recently seen for this issue and is to see a general surgeon on 4th of October.  States he has known gallstones but the pain has worsened at this time.  Patient states he is generally healthy and is only treated for asthma and COPD.  Patient uses daily inhalers to treat.  Patient denies fever and chills.  Patient has no other symptoms at this time.      Review of Systems   Gastrointestinal:         Right upper quadrant pain   All other systems reviewed and are negative.    Past Medical History:   Diagnosis Date    Asthma     COPD (chronic obstructive pulmonary disease)     Inguinal hernia        Allergies   Allergen Reactions    Prozac [Fluoxetine] Unknown - Low Severity     Chest pain        Past Surgical History:   Procedure Laterality Date    HERNIA REPAIR         No family history on file.    Social History     Socioeconomic History    Marital status:    Substance and Sexual Activity    Alcohol use: Yes           Objective   Physical Exam  Vitals and nursing note reviewed.   Constitutional:       General: He is not in acute distress.     Appearance: Normal appearance. He is not toxic-appearing or diaphoretic.   HENT:      Head: Normocephalic and atraumatic.      Right Ear: External ear normal.      Left Ear: External ear normal.      Nose: Nose normal.      Mouth/Throat:      Mouth: Mucous membranes are moist.   Eyes:      General:         Right eye: No discharge.         Left eye: No discharge.      Extraocular Movements: Extraocular movements intact.      Conjunctiva/sclera: Conjunctivae normal.   Cardiovascular:      Rate and Rhythm: Normal rate.   Pulmonary:      Effort: Pulmonary effort is normal. No respiratory distress.       Breath sounds: Normal breath sounds. No rhonchi.      Comments: Sounds clear bilaterally, breathing unlabored  Abdominal:      General: Abdomen is flat. Bowel sounds are normal.      Tenderness: There is abdominal tenderness in the right upper quadrant. There is guarding. There is no right CVA tenderness, left CVA tenderness or rebound.      Comments: Abdomen nondistended, tender to right upper quadrant upon palpation, bowel sounds active in all 4 quadrants.  Patient is positive for guarding and right upper quadrant.   Genitourinary:     Penis: Normal.       Testes: Normal.   Musculoskeletal:         General: No deformity or signs of injury.      Cervical back: Normal range of motion.   Skin:     General: Skin is warm.      Capillary Refill: Capillary refill takes less than 2 seconds.      Coloration: Skin is not jaundiced.   Neurological:      General: No focal deficit present.      Mental Status: He is alert and oriented to person, place, and time. Mental status is at baseline.   Psychiatric:         Mood and Affect: Mood normal.         Behavior: Behavior normal.         Thought Content: Thought content normal.         Judgment: Judgment normal.       Procedures           ED Course      Labs Reviewed   COMPREHENSIVE METABOLIC PANEL - Abnormal; Notable for the following components:       Result Value    Calcium 8.3 (*)     All other components within normal limits    Narrative:     GFR Normal >60  Chronic Kidney Disease <60  Kidney Failure <15     URINALYSIS W/ MICROSCOPIC IF INDICATED (NO CULTURE) - Abnormal; Notable for the following components:    Color, UA Dark Yellow (*)     Specific Gravity, UA >1.030 (*)     Ketones, UA Trace (*)     Protein, UA Trace (*)     All other components within normal limits    Narrative:     Urine microscopic not indicated.   CBC WITH AUTO DIFFERENTIAL - Abnormal; Notable for the following components:    Hemoglobin 11.7 (*)     MCH 25.6 (*)     MCHC 30.4 (*)     RDW 16.1 (*)      Eosinophil % 10.1 (*)     Eosinophils, Absolute 0.60 (*)     All other components within normal limits   LIPASE - Normal   CBC AND DIFFERENTIAL    Narrative:     The following orders were created for panel order CBC & Differential.  Procedure                               Abnormality         Status                     ---------                               -----------         ------                     CBC Auto Differential[[id number]]        Abnormal            Final result                 Please view results for these tests on the individual orders.                                            Medical Decision Making  Pt stable in EC - NAD. T/o pending CT results - pt with reported hx of gallstones.  CT with no acute findings.  Labs show some dehydration otherwise unremarkable.  No evid of obstruction/ischemia/perf. No evid of cholecystitis.  Will d/c to home - given rx for bentyl.  Recommend continued outpt f/ul    Amount and/or Complexity of Data Reviewed  Labs: ordered.  Radiology: ordered.    Risk  Prescription drug management.        Final diagnoses:   Abdominal pain, unspecified abdominal location       ED Disposition  ED Disposition       ED Disposition   Discharge    Condition   Stable    Comment   --               Provider, No Known  [facility] [id number]  [phone]               Medication List        New Prescriptions      dicyclomine 20 MG tablet  Commonly known as: BENTYL  Take 1 tablet by mouth Every 8 (Eight) Hours As Needed for Abdominal Cramping.               Where to Get Your Medications        These medications were sent to [facility] [id number] - [place], [place] - [address] [place] [address] [phone]  - [phone]   [address] [facility] [address]      Phone: [phone]   dicyclomine 20 MG tablet            Rey Westbrook DO  09/30/23 0344

## 2023-10-04 ENCOUNTER — OFFICE VISIT (OUTPATIENT)
Dept: SURGERY | Facility: CLINIC | Age: 58
End: 2023-10-04
Payer: MEDICARE

## 2023-10-04 VITALS
SYSTOLIC BLOOD PRESSURE: 154 MMHG | BODY MASS INDEX: 36.96 KG/M2 | OXYGEN SATURATION: 95 % | WEIGHT: 230 LBS | DIASTOLIC BLOOD PRESSURE: 92 MMHG | HEIGHT: 66 IN | HEART RATE: 98 BPM

## 2023-10-04 DIAGNOSIS — K80.12 CALCULUS OF GALLBLADDER WITH ACUTE ON CHRONIC CHOLECYSTITIS WITHOUT OBSTRUCTION: Primary | ICD-10-CM

## 2023-10-04 DIAGNOSIS — K40.20 NON-RECURRENT BILATERAL INGUINAL HERNIA WITHOUT OBSTRUCTION OR GANGRENE: ICD-10-CM

## 2023-10-04 PROBLEM — K80.10 CHOLECYSTITIS WITH CHOLELITHIASIS: Status: ACTIVE | Noted: 2023-10-04

## 2023-10-04 PROCEDURE — 1160F RVW MEDS BY RX/DR IN RCRD: CPT | Performed by: SPECIALIST

## 2023-10-04 PROCEDURE — 99214 OFFICE O/P EST MOD 30 MIN: CPT | Performed by: SPECIALIST

## 2023-10-04 PROCEDURE — 1159F MED LIST DOCD IN RCRD: CPT | Performed by: SPECIALIST

## 2023-10-04 RX ORDER — ONDANSETRON 2 MG/ML
4 INJECTION INTRAMUSCULAR; INTRAVENOUS EVERY 6 HOURS PRN
OUTPATIENT
Start: 2023-10-04

## 2023-10-04 RX ORDER — ASPIRIN 81 MG/1
81 TABLET, CHEWABLE ORAL DAILY
COMMUNITY

## 2023-10-04 RX ORDER — RANITIDINE 300 MG/1
1 TABLET ORAL 2 TIMES DAILY
COMMUNITY

## 2023-10-04 RX ORDER — FLUTICASONE PROPIONATE 220 UG/1
AEROSOL, METERED RESPIRATORY (INHALATION)
COMMUNITY

## 2023-10-04 RX ORDER — SODIUM CHLORIDE 9 MG/ML
100 INJECTION, SOLUTION INTRAVENOUS CONTINUOUS
OUTPATIENT
Start: 2023-10-04

## 2023-10-04 NOTE — H&P (VIEW-ONLY)
Office Established Patient Note:     Referring Provider: Emergency room    Chief complaint follow-up cholelithiasis    Subjective .     History of present illness: Mr. Up is a 58y.o. who is here for evaluation of increasingly symptomatic cholelithiasis.  He is noted to have right-sided discomfort and presented to the emergency room recently with this abdominal pain CT scan was completed showing question of stones but ultrasound showing sludge and small gallstones noted by ultrasound on 31 May.  With these increasingly symptomatic stones he desires to have surgical intervention and plan laparoscopic cholecystectomy and treatment the above problem on 20 June.    We initially planned surgery on 20 June but insurance problems arose for the patient to put this off until now, now he continues with this midepigastric right upper quadrant pain also occasional to his back pain he is also noted to have some swelling in his lower abdomen and has increasing the symptomatic hernias.  He is advised that he does have bilateral inguinal hernia but cannot look toward repair of these at the same time as his gallbladder he desires cholecystectomy we will look toward laparoscopic cholecystectomy and treatment of the above problem on 16 October.  Risk and benefits discussed he appears to understand and gives his informed consent for surgery.         Past surgical history significant for gastric sleeve in the past, bilateral hip replacement bilateral knee reconstruction, umbilical hernia repair right shoulder replacement.     Medications inhaler and Singulair.  Previous TIAs in the past.  Allergies to Prozac.     Review of systems positive for reading glasses, pneumonia, asthma, abnormal rhythm mini stroke nausea vomiting heartburn chronic back pain osteoarthritis and headaches.     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  . Calculus of gallbladder with acute on chronic cholecystitis without obstruction (Primary)  -     Case Request; Standing  -     sodium chloride 0.9 % infusion  -     ondansetron (ZOFRAN) injection 4 mg  -     Case Request     2. TIA (transient ischemic attack)  -     US Carotid Bilateral; Future     3. Primary osteoarthritis involving multiple joints     4. Cerebral infarction due to embolism of left carotid artery  -     US Carotid Bilateral; Future     Other orders  -     Follow Anesthesia Guidelines / Protocol; Future  -     Follow Anesthesia Guidelines / Protocol; Standing  -     Verify / Perform Chlorhexidine Skin Prep; Standing  -     Obtain Informed Consent; Future  -     Provide NPO Instructions to Patient; Future  -     Chlorhexidine Skin Prep; Future  -     Initiate Observation Status; Standing        57-year-old gentleman with increasing the symptomatic cholelithiasis plan for laparoscopic exploration cholecystectomy on 20 June.  Risk and benefits discussed with full knowledge of this and apparent understanding he gives his informed consent for surgery.  History  Past Medical History:   Diagnosis Date    Asthma     COPD (chronic obstructive pulmonary disease)     Inguinal hernia    ,   Past Surgical History:   Procedure Laterality Date    HERNIA REPAIR     , No family history on file.,   Social History     Substance Use Topics    Alcohol use: Yes   , (Not in a hospital admission)   and Allergies:  Prozac [fluoxetine]    Current Outpatient Medications:     albuterol sulfate  (90 Base) MCG/ACT inhaler, Inhale 2 puffs Every 4 (Four) Hours As Needed for Wheezing., Disp: , Rfl:     dicyclomine (BENTYL) 20 MG tablet, Take 1 tablet by mouth Every 8 (Eight) Hours As Needed for Abdominal Cramping., Disp: 10 tablet, Rfl: 0    ipratropium-albuterol (DUO-NEB) 0.5-2.5 mg/3 ml nebulizer, Take 3 mL by nebulization Every 4 (Four) Hours As Needed for Wheezing., Disp: , Rfl:     ketorolac  (TORADOL) 10 MG tablet, Take 1 tablet by mouth Every 6 (Six) Hours As Needed for Moderate Pain., Disp: 9 tablet, Rfl: 0    montelukast (SINGULAIR) 10 MG tablet, Take 1 tablet by mouth Every Night., Disp: , Rfl:     Objective     Vital Signs   There were no vitals taken for this visit.     Physical Exam:  Obese abdomen, soft, awake alert and oriented    Respirations clear and equal to auscultation and percussion    Cardiovascular exam regular rate and rhythm    Abdomen is soft, flat, obese, his previous gastric sleeve was laparoscopic, he has a moderate size left inguinal hernia and a small right inguinal hernia.  Both reducible.    Results Review:  Result Review :  No results found for: FINALDX, GROSSDES         Assessment & Plan     57-year-old obese gentleman, BMI of 38, previous gastric sleeve, now with increasingly symptomatic cholelithiasis, also bilateral inguinal hernia.  Binder repair and resected as gallbladder and then in the future look toward repair of his hernias.  He is aware the procedure of the findings the risk and benefits with full knowledge of this and apparent understanding he gives his informed consent for surgery.  Discussed BMI elevation and need to move toward a reduced BMI for health concerns he appears to understand he is a smoker and his meds were reviewed.      David Talamantes MD  10/04/23  07:33 CDT

## 2023-10-04 NOTE — PATIENT INSTRUCTIONS
Nice to see you again Mr. Up, we will look toward removal of your gallbladder on Monday, 16 October, please take 1 bottle of magnesium citrate on [place], 15 October your surgery be on the 16th and then in the future we can look toward repair of your hernias.    Surgery; Monday; 10/16/23 arrive at 6:30 am  Pre-work; 10/11/23 arrive at 11:15 a.m ( no fasting required this day)  NPO after midnight the night before surgery  Bowel prep: drink 1 (10oz) bottle of magnesium citrate at 4 pm the day before surgery. You may have a light meal for dinner that evening.  Check in at the main registration desk located at the main entrance of the hospital on both days.

## 2023-10-11 ENCOUNTER — PRE-ADMISSION TESTING (OUTPATIENT)
Dept: PREADMISSION TESTING | Facility: HOSPITAL | Age: 58
End: 2023-10-11
Payer: MEDICARE

## 2023-10-11 VITALS
HEART RATE: 96 BPM | OXYGEN SATURATION: 95 % | HEIGHT: 65 IN | WEIGHT: 238.76 LBS | DIASTOLIC BLOOD PRESSURE: 82 MMHG | SYSTOLIC BLOOD PRESSURE: 158 MMHG | BODY MASS INDEX: 39.78 KG/M2 | RESPIRATION RATE: 18 BRPM

## 2023-10-11 LAB
ANION GAP SERPL CALCULATED.3IONS-SCNC: 10 MMOL/L (ref 5–15)
BUN SERPL-MCNC: 18 MG/DL (ref 6–20)
BUN/CREAT SERPL: 26.5 (ref 7–25)
CALCIUM SPEC-SCNC: 8.9 MG/DL (ref 8.6–10.5)
CHLORIDE SERPL-SCNC: 103 MMOL/L (ref 98–107)
CO2 SERPL-SCNC: 28 MMOL/L (ref 22–29)
CREAT SERPL-MCNC: 0.68 MG/DL (ref 0.76–1.27)
DEPRECATED RDW RBC AUTO: 50.9 FL (ref 37–54)
EGFRCR SERPLBLD CKD-EPI 2021: 109.1 ML/MIN/1.73
ERYTHROCYTE [DISTWIDTH] IN BLOOD BY AUTOMATED COUNT: 16.4 % (ref 12.3–15.4)
GLUCOSE SERPL-MCNC: 101 MG/DL (ref 65–99)
HCT VFR BLD AUTO: 41.1 % (ref 37.5–51)
HGB BLD-MCNC: 12.5 G/DL (ref 13–17.7)
MCH RBC QN AUTO: 25.8 PG (ref 26.6–33)
MCHC RBC AUTO-ENTMCNC: 30.4 G/DL (ref 31.5–35.7)
MCV RBC AUTO: 84.7 FL (ref 79–97)
PLATELET # BLD AUTO: 280 10*3/MM3 (ref 140–450)
PMV BLD AUTO: 10.1 FL (ref 6–12)
POTASSIUM SERPL-SCNC: 4.3 MMOL/L (ref 3.5–5.2)
RBC # BLD AUTO: 4.85 10*6/MM3 (ref 4.14–5.8)
SODIUM SERPL-SCNC: 141 MMOL/L (ref 136–145)
WBC NRBC COR # BLD: 7.4 10*3/MM3 (ref 3.4–10.8)

## 2023-10-11 PROCEDURE — 36415 COLL VENOUS BLD VENIPUNCTURE: CPT

## 2023-10-11 PROCEDURE — 80048 BASIC METABOLIC PNL TOTAL CA: CPT

## 2023-10-11 PROCEDURE — 85027 COMPLETE CBC AUTOMATED: CPT

## 2023-10-11 NOTE — DISCHARGE INSTRUCTIONS
Before you come to the hospital        Arrival time: AS DIRECTED BY OFFICE     YOU MAY TAKE THE FOLLOWING MEDICATION(S) THE MORNING OF SURGERY WITH A SIP OF WATER: ***  zantac           ALL OTHER HOME MEDICATION CHECK WITH YOUR PHYSICIAN (especially if   you are taking diabetes medicines or blood thinners)    Do not take any Erectile Dysfunction medications (EX: CIALIS, VIAGRA) 24 hours prior to surgery.      If you were given and instructed to use a germ- killing soap, use as directed the night before surgery and again the morning of surgery or as directed by your surgeon. (Use one-half of the bottle with each shower.)   See attached information for How to Use Chlorhexidine for Bathing if applicable.            Eating and drinking restrictions prior to scheduled arrival time    2 Hours before arrival time STOP   Drinking Clear liquids (water, black coffee-NO CREAM,  apple juice-no pulp)      8 Hours before arrival time STOP   All food, full liquids, and dairy products    (It is extremely important that you follow these guidelines to prevent delay or cancelation of your procedure)     Clear Liquids  Water and flavored water                                                                      Clear Fruit juices, such as cranberry juice and apple juice.  Black coffee (NO cream of any kind, including powdered).  Plain tea  Clear bouillon or broth.  Flavored gelatin.  Soda.  Gatorade or Powerade.  Full liquid examples  Juices that have pulp.  Frozen ice pops that contain fruit pieces.  Coffee with creamer  Milk.  Yogurt.                MANAGING PAIN AFTER SURGERY    We know you are probably wondering what your pain will be like after surgery.  Following surgery it is unrealistic to expect you will not have pain.   Pain is how our bodies let us know that something is wrong or cautions us to be careful.  That said, our goal is to make your pain tolerable.    Methods we may use to treat your pain include (oral or IV  medications, PCAs, epidurals, nerve blocks, etc.)   While some procedures require IV pain medications for a short time after surgery, transitioning to pain medications by mouth allows for better management of pain.   Your nurse will encourage you to take oral pain medications whenever possible.  IV medications work almost immediately, but only last a short while.  Taking medications by mouth allows for a more constant level of medication in your blood stream for a longer period of time.      Once your pain is out of control it is harder to get back under control.  It is important you are aware when your next dose of pain medication is due.  If you are admitted, your nurse may write the time of your next dose on the white board in your room to help you remember.      We are interested in your pain and encourage you to inform us about aggravating factors during your visit.   Many times a simple repositioning every few hours can make a big difference.    If your physician says it is okay, do not let your pain prevent you from getting out of bed. Be sure to call your nurse for assistance prior to getting up so you do not fall.      Before surgery, please decide your tolerable pain goal.  These faces help describe the pain ratings we use on a 0-10 scale.   Be prepared to tell us your goal and whether or not you take pain or anxiety medications at home.          Preparing for Surgery  Preparing for surgery is an important part of your care. It can make things go more smoothly and help you avoid complications. The steps leading up to surgery may vary among hospitals. Follow all instructions given to you by your health care providers. Ask questions if you do not understand something. Talk about any concerns that you have.  Here are some questions to consider asking before your surgery:  If my surgery is not an emergency (is elective), when would be the best time to have the surgery?  What arrangements do I need to make for  work, home, or school?  What will my recovery be like? How long will it be before I can return to normal activities?  Will I need to prepare my home? Will I need to arrange care for me or my children?  Should I expect to have pain after surgery? What are my pain management options? Are there nonmedical options that I can try for pain?  Tell a health care provider about:  Any allergies you have.  All medicines you are taking, including vitamins, herbs, eye drops, creams, and over-the-counter medicines.  Any problems you or family members have had with anesthetic medicines.  Any blood disorders you have.  Any surgeries you have had.  Any medical conditions you have.  Whether you are pregnant or may be pregnant.  What are the risks?  The risks and complications of surgery depend on the specific procedure that you have. Discuss all the risks with your health care providers before your surgery. Ask about common surgical complications, which may include:  Infection.  Bleeding or a need for blood replacement (transfusion).  Allergic reactions to medicines.  Damage to surrounding nerves, tissues, or structures.  A blood clot.  Scarring.  Failure of the surgery to correct the problem.  Follow these instructions before the procedure:  Several days or weeks before your procedure  You may have a physical exam by your primary health care provider to make sure it is safe for you to have surgery.  You may have testing. This may include a chest X-ray, blood and urine tests, electrocardiogram (ECG), or other testing.  Ask your health care provider about:  Changing or stopping your regular medicines. This is especially important if you are taking diabetes medicines or blood thinners.  Taking medicines such as aspirin and ibuprofen. These medicines can thin your blood. Do not take these medicines unless your health care provider tells you to take them.  Taking over-the-counter medicines, vitamins, herbs, and supplements.  Do not use  any products that contain nicotine or tobacco, such as cigarettes and e-cigarettes. If you need help quitting, ask your health care provider.  Avoid alcohol.  Ask your health care provider if there are exercises you can do to prepare for surgery.  Eat a healthy diet.   Plan to have someone 18 years of age or older to take you home from the hospital. We will need to verify your ride on the morning of surgery if you are being discharged home on the same day. Tell your ride to be expecting a call from the hospital prior to your procedure.   Plan to have a responsible adult care for you for at least 24 hours after you leave the hospital or clinic. This is important.  The day before your procedure  You may be given antibiotic medicine to take by mouth to help prevent infection. Take it as told by your health care provider.  You may be asked to shower with a germ-killing soap.  Follow instructions from your health care provider about eating and drinking restrictions. This includes gum, mints and hard candy.  Pack comfortable clothes according to your procedure.   The day of your procedure  You may need to take another shower with a germ-killing soap before you leave home in the morning.  With a small sip of water, take only the medicines that you are told to take.  Remove all jewelry including rings.   Leave anything you consider valuable at home except hearing aids if needed.  You do not need to bring your home medications into the hospital.   Do not wear any makeup, nail polish, powder, deodorant, lotion, hair accessories, or anything on your skin or body except your clothes.  If you will be staying in the hospital, bring a case to hold your glasses, contacts, or dentures. You may also want to bring your robe and non-skid footwear.       (Do not use denture adhesives since you will be asked to remove them during  surgery).   If you wear oxygen at home, bring it with you the day of surgery.  If instructed by your health  care provider, bring your sleep apnea device with you on the day of your surgery (if this applies to you).  You may want to leave your suitcase and sleep apnea device in the car until after surgery.   Arrive at the hospital as scheduled.  Bring a friend or family member with you who can help to answer questions and be present while you meet with your health care provider.  At the hospital  When you arrive at the hospital:  Go to registration located at the main entrance of the hospital. You will be registered and given a beeper and a sticker sheet. Take the stickers to the Outpatient nurses desk and place in the black tray. This is to notify staff that you have arrived. Then return to the lobby to wait.   When your beeper lights up and vibrates proceed through the double doors, under the stairs, and a member of the Outpatient Surgery staff will escort you to your preoperative room.  You may have to wear compression sleeves. These help to prevent blood clots and reduce swelling in your legs.  An IV may be inserted into one of your veins.              In the operating room, you may be given one or more of the following:        A medicine to help you relax (sedative).        A medicine to numb the area (local anesthetic).        A medicine to make you fall asleep (general anesthetic).        A medicine that is injected into an area of your body to numb everything below the                      injection site (regional anesthetic).  You may be given an antibiotic through your IV to help prevent infection.  Your surgical site will be marked or identified.    Contact a health care provider if you:  Develop a fever of more than 100.4°F (38°C) or other feelings of illness during the 48 hours before your surgery.  Have symptoms that get worse.  Have questions or concerns about your surgery.  Summary  Preparing for surgery can make the procedure go more smoothly and lower your risk of complications.  Before surgery, make a  list of questions and concerns to discuss with your surgeon. Ask about the risks and possible complications.  In the days or weeks before your surgery, follow all instructions from your health care provider. You may need to stop smoking, avoid alcohol, follow eating restrictions, and change or stop your regular medicines.  Contact your surgeon if you develop a fever or other signs of illness during the few days before your surgery.  This information is not intended to replace advice given to you by your health care provider. Make sure you discuss any questions you have with your health care provider.  Document Revised: 12/21/2018 Document Reviewed: 10/23/2018  Elsevier Patient Education © 2021 [employer]

## 2023-10-16 ENCOUNTER — APPOINTMENT (OUTPATIENT)
Dept: GENERAL RADIOLOGY | Facility: HOSPITAL | Age: 58
End: 2023-10-16
Payer: MEDICARE

## 2023-10-16 ENCOUNTER — ANESTHESIA EVENT (OUTPATIENT)
Dept: PERIOP | Facility: HOSPITAL | Age: 58
End: 2023-10-16
Payer: MEDICARE

## 2023-10-16 ENCOUNTER — HOSPITAL ENCOUNTER (OUTPATIENT)
Facility: HOSPITAL | Age: 58
Setting detail: HOSPITAL OUTPATIENT SURGERY
Discharge: HOME | End: 2023-10-16
Attending: SPECIALIST | Admitting: SPECIALIST
Payer: MEDICARE

## 2023-10-16 ENCOUNTER — ANESTHESIA (OUTPATIENT)
Dept: PERIOP | Facility: HOSPITAL | Age: 58
End: 2023-10-16
Payer: MEDICARE

## 2023-10-16 VITALS
OXYGEN SATURATION: 92 % | RESPIRATION RATE: 16 BRPM | HEART RATE: 63 BPM | DIASTOLIC BLOOD PRESSURE: 82 MMHG | SYSTOLIC BLOOD PRESSURE: 135 MMHG | TEMPERATURE: 98 F

## 2023-10-16 DIAGNOSIS — K40.20 NON-RECURRENT BILATERAL INGUINAL HERNIA WITHOUT OBSTRUCTION OR GANGRENE: ICD-10-CM

## 2023-10-16 DIAGNOSIS — K80.12 CALCULUS OF GALLBLADDER WITH ACUTE ON CHRONIC CHOLECYSTITIS WITHOUT OBSTRUCTION: ICD-10-CM

## 2023-10-16 PROCEDURE — 25010000002 HYDROMORPHONE PER 4 MG: Performed by: NURSE ANESTHETIST, CERTIFIED REGISTERED

## 2023-10-16 PROCEDURE — 25010000002 FENTANYL CITRATE (PF) 100 MCG/2ML SOLUTION: Performed by: NURSE ANESTHETIST, CERTIFIED REGISTERED

## 2023-10-16 PROCEDURE — 25810000003 SODIUM CHLORIDE PER 500 ML: Performed by: SPECIALIST

## 2023-10-16 PROCEDURE — 74300 X-RAY BILE DUCTS/PANCREAS: CPT

## 2023-10-16 PROCEDURE — 25010000002 KETOROLAC TROMETHAMINE PER 15 MG: Performed by: NURSE ANESTHETIST, CERTIFIED REGISTERED

## 2023-10-16 PROCEDURE — 25010000002 CEFOXITIN PER 1 G: Performed by: SPECIALIST

## 2023-10-16 PROCEDURE — 25010000002 DEXAMETHASONE PER 1 MG: Performed by: NURSE ANESTHETIST, CERTIFIED REGISTERED

## 2023-10-16 PROCEDURE — C1726 CATH, BAL DIL, NON-VASCULAR: HCPCS | Performed by: SPECIALIST

## 2023-10-16 PROCEDURE — 76000 FLUOROSCOPY <1 HR PHYS/QHP: CPT

## 2023-10-16 PROCEDURE — 88304 TISSUE EXAM BY PATHOLOGIST: CPT | Performed by: SPECIALIST

## 2023-10-16 PROCEDURE — 47563 LAPARO CHOLECYSTECTOMY/GRAPH: CPT | Performed by: SPECIALIST

## 2023-10-16 PROCEDURE — 25510000001 IOPAMIDOL 61 % SOLUTION: Performed by: SPECIALIST

## 2023-10-16 PROCEDURE — 25010000002 PROPOFOL 10 MG/ML EMULSION: Performed by: NURSE ANESTHETIST, CERTIFIED REGISTERED

## 2023-10-16 PROCEDURE — 25810000003 LACTATED RINGERS PER 1000 ML: Performed by: SPECIALIST

## 2023-10-16 PROCEDURE — 25010000002 ONDANSETRON PER 1 MG: Performed by: NURSE ANESTHETIST, CERTIFIED REGISTERED

## 2023-10-16 DEVICE — LIGACLIP 10-M/L, 10MM ENDOSCOPIC ROTATING MULTIPLE CLIP APPLIERS
Type: IMPLANTABLE DEVICE | Site: ABDOMEN | Status: FUNCTIONAL
Brand: LIGACLIP

## 2023-10-16 DEVICE — HEMOST ABS SURGICEL SNOW 1X2IN: Type: IMPLANTABLE DEVICE | Site: ABDOMEN | Status: FUNCTIONAL

## 2023-10-16 RX ORDER — SODIUM CHLORIDE 0.9 % (FLUSH) 0.9 %
3 SYRINGE (ML) INJECTION EVERY 12 HOURS SCHEDULED
Status: DISCONTINUED | OUTPATIENT
Start: 2023-10-16 | End: 2023-10-16 | Stop reason: HOSPADM

## 2023-10-16 RX ORDER — SODIUM CHLORIDE, SODIUM LACTATE, POTASSIUM CHLORIDE, CALCIUM CHLORIDE 600; 310; 30; 20 MG/100ML; MG/100ML; MG/100ML; MG/100ML
1000 INJECTION, SOLUTION INTRAVENOUS CONTINUOUS
Status: DISCONTINUED | OUTPATIENT
Start: 2023-10-16 | End: 2023-10-16 | Stop reason: HOSPADM

## 2023-10-16 RX ORDER — KETOROLAC TROMETHAMINE 30 MG/ML
INJECTION, SOLUTION INTRAMUSCULAR; INTRAVENOUS AS NEEDED
Status: DISCONTINUED | OUTPATIENT
Start: 2023-10-16 | End: 2023-10-16 | Stop reason: SURG

## 2023-10-16 RX ORDER — SODIUM CHLORIDE 9 MG/ML
40 INJECTION, SOLUTION INTRAVENOUS AS NEEDED
Status: DISCONTINUED | OUTPATIENT
Start: 2023-10-16 | End: 2023-10-16 | Stop reason: HOSPADM

## 2023-10-16 RX ORDER — SODIUM CHLORIDE 0.9 % (FLUSH) 0.9 %
3 SYRINGE (ML) INJECTION AS NEEDED
Status: DISCONTINUED | OUTPATIENT
Start: 2023-10-16 | End: 2023-10-16 | Stop reason: HOSPADM

## 2023-10-16 RX ORDER — DROPERIDOL 2.5 MG/ML
0.62 INJECTION, SOLUTION INTRAMUSCULAR; INTRAVENOUS ONCE AS NEEDED
Status: DISCONTINUED | OUTPATIENT
Start: 2023-10-16 | End: 2023-10-16 | Stop reason: HOSPADM

## 2023-10-16 RX ORDER — TIZANIDINE 4 MG/1
4 TABLET ORAL EVERY 8 HOURS PRN
COMMUNITY
Start: 2023-10-13

## 2023-10-16 RX ORDER — HYDROMORPHONE HYDROCHLORIDE 2 MG/ML
INJECTION, SOLUTION INTRAMUSCULAR; INTRAVENOUS; SUBCUTANEOUS AS NEEDED
Status: DISCONTINUED | OUTPATIENT
Start: 2023-10-16 | End: 2023-10-16 | Stop reason: SURG

## 2023-10-16 RX ORDER — ONDANSETRON 2 MG/ML
INJECTION INTRAMUSCULAR; INTRAVENOUS AS NEEDED
Status: DISCONTINUED | OUTPATIENT
Start: 2023-10-16 | End: 2023-10-16 | Stop reason: SURG

## 2023-10-16 RX ORDER — ONDANSETRON 2 MG/ML
4 INJECTION INTRAMUSCULAR; INTRAVENOUS ONCE AS NEEDED
Status: DISCONTINUED | OUTPATIENT
Start: 2023-10-16 | End: 2023-10-16 | Stop reason: HOSPADM

## 2023-10-16 RX ORDER — HYDROCODONE BITARTRATE AND ACETAMINOPHEN 5; 325 MG/1; MG/1
1 TABLET ORAL EVERY 4 HOURS PRN
Qty: 15 TABLET | Refills: 0 | Status: SHIPPED | OUTPATIENT
Start: 2023-10-16

## 2023-10-16 RX ORDER — DEXAMETHASONE SODIUM PHOSPHATE 4 MG/ML
INJECTION, SOLUTION INTRA-ARTICULAR; INTRALESIONAL; INTRAMUSCULAR; INTRAVENOUS; SOFT TISSUE AS NEEDED
Status: DISCONTINUED | OUTPATIENT
Start: 2023-10-16 | End: 2023-10-16 | Stop reason: SURG

## 2023-10-16 RX ORDER — ONDANSETRON 2 MG/ML
4 INJECTION INTRAMUSCULAR; INTRAVENOUS EVERY 6 HOURS PRN
Status: DISCONTINUED | OUTPATIENT
Start: 2023-10-16 | End: 2023-10-16 | Stop reason: HOSPADM

## 2023-10-16 RX ORDER — SODIUM CHLORIDE, SODIUM LACTATE, POTASSIUM CHLORIDE, CALCIUM CHLORIDE 600; 310; 30; 20 MG/100ML; MG/100ML; MG/100ML; MG/100ML
100 INJECTION, SOLUTION INTRAVENOUS CONTINUOUS
Status: DISCONTINUED | OUTPATIENT
Start: 2023-10-16 | End: 2023-10-16 | Stop reason: HOSPADM

## 2023-10-16 RX ORDER — KETOROLAC TROMETHAMINE 10 MG/1
10 TABLET, FILM COATED ORAL EVERY 6 HOURS PRN
Qty: 15 TABLET | Refills: 1 | Status: SHIPPED | OUTPATIENT
Start: 2023-10-16

## 2023-10-16 RX ORDER — SODIUM CHLORIDE 9 MG/ML
INJECTION, SOLUTION INTRAVENOUS AS NEEDED
Status: DISCONTINUED | OUTPATIENT
Start: 2023-10-16 | End: 2023-10-16 | Stop reason: HOSPADM

## 2023-10-16 RX ORDER — FENTANYL CITRATE 50 UG/ML
INJECTION, SOLUTION INTRAMUSCULAR; INTRAVENOUS AS NEEDED
Status: DISCONTINUED | OUTPATIENT
Start: 2023-10-16 | End: 2023-10-16 | Stop reason: SURG

## 2023-10-16 RX ORDER — SODIUM CHLORIDE 9 MG/ML
100 INJECTION, SOLUTION INTRAVENOUS CONTINUOUS
Status: DISCONTINUED | OUTPATIENT
Start: 2023-10-16 | End: 2023-10-16 | Stop reason: HOSPADM

## 2023-10-16 RX ORDER — ROCURONIUM BROMIDE 10 MG/ML
INJECTION, SOLUTION INTRAVENOUS AS NEEDED
Status: DISCONTINUED | OUTPATIENT
Start: 2023-10-16 | End: 2023-10-16 | Stop reason: SURG

## 2023-10-16 RX ORDER — FENTANYL CITRATE 50 UG/ML
25 INJECTION, SOLUTION INTRAMUSCULAR; INTRAVENOUS
Status: DISCONTINUED | OUTPATIENT
Start: 2023-10-16 | End: 2023-10-16 | Stop reason: HOSPADM

## 2023-10-16 RX ORDER — MAGNESIUM HYDROXIDE 1200 MG/15ML
LIQUID ORAL AS NEEDED
Status: DISCONTINUED | OUTPATIENT
Start: 2023-10-16 | End: 2023-10-16 | Stop reason: HOSPADM

## 2023-10-16 RX ORDER — NALOXONE HCL 0.4 MG/ML
0.4 VIAL (ML) INJECTION AS NEEDED
Status: DISCONTINUED | OUTPATIENT
Start: 2023-10-16 | End: 2023-10-16 | Stop reason: HOSPADM

## 2023-10-16 RX ORDER — PROPOFOL 10 MG/ML
VIAL (ML) INTRAVENOUS AS NEEDED
Status: DISCONTINUED | OUTPATIENT
Start: 2023-10-16 | End: 2023-10-16 | Stop reason: SURG

## 2023-10-16 RX ORDER — BUPIVACAINE HYDROCHLORIDE AND EPINEPHRINE 5; 5 MG/ML; UG/ML
INJECTION, SOLUTION PERINEURAL AS NEEDED
Status: DISCONTINUED | OUTPATIENT
Start: 2023-10-16 | End: 2023-10-16 | Stop reason: HOSPADM

## 2023-10-16 RX ORDER — HYDROCODONE BITARTRATE AND ACETAMINOPHEN 10; 325 MG/1; MG/1
1 TABLET ORAL EVERY 4 HOURS PRN
Status: DISCONTINUED | OUTPATIENT
Start: 2023-10-16 | End: 2023-10-16 | Stop reason: HOSPADM

## 2023-10-16 RX ORDER — LIDOCAINE HYDROCHLORIDE 10 MG/ML
0.5 INJECTION, SOLUTION EPIDURAL; INFILTRATION; INTRACAUDAL; PERINEURAL ONCE AS NEEDED
Status: DISCONTINUED | OUTPATIENT
Start: 2023-10-16 | End: 2023-10-16 | Stop reason: HOSPADM

## 2023-10-16 RX ORDER — LIDOCAINE HYDROCHLORIDE 20 MG/ML
INJECTION, SOLUTION EPIDURAL; INFILTRATION; INTRACAUDAL; PERINEURAL AS NEEDED
Status: DISCONTINUED | OUTPATIENT
Start: 2023-10-16 | End: 2023-10-16 | Stop reason: SURG

## 2023-10-16 RX ORDER — FLUMAZENIL 0.1 MG/ML
0.2 INJECTION INTRAVENOUS AS NEEDED
Status: DISCONTINUED | OUTPATIENT
Start: 2023-10-16 | End: 2023-10-16 | Stop reason: HOSPADM

## 2023-10-16 RX ORDER — ACETAMINOPHEN 500 MG
1000 TABLET ORAL ONCE
Status: COMPLETED | OUTPATIENT
Start: 2023-10-16 | End: 2023-10-16

## 2023-10-16 RX ORDER — GLYCOPYRROLATE 0.2 MG/ML
INJECTION INTRAMUSCULAR; INTRAVENOUS AS NEEDED
Status: DISCONTINUED | OUTPATIENT
Start: 2023-10-16 | End: 2023-10-16 | Stop reason: SURG

## 2023-10-16 RX ORDER — SODIUM CHLORIDE 0.9 % (FLUSH) 0.9 %
3-10 SYRINGE (ML) INJECTION AS NEEDED
Status: DISCONTINUED | OUTPATIENT
Start: 2023-10-16 | End: 2023-10-16 | Stop reason: HOSPADM

## 2023-10-16 RX ORDER — IBUPROFEN 600 MG/1
600 TABLET ORAL ONCE AS NEEDED
Status: DISCONTINUED | OUTPATIENT
Start: 2023-10-16 | End: 2023-10-16 | Stop reason: HOSPADM

## 2023-10-16 RX ORDER — NEOSTIGMINE METHYLSULFATE 5 MG/5 ML
SYRINGE (ML) INTRAVENOUS AS NEEDED
Status: DISCONTINUED | OUTPATIENT
Start: 2023-10-16 | End: 2023-10-16 | Stop reason: SURG

## 2023-10-16 RX ORDER — ONDANSETRON HYDROCHLORIDE 8 MG/1
4 TABLET, FILM COATED ORAL EVERY 8 HOURS PRN
Qty: 5 TABLET | Refills: 1 | Status: SHIPPED | OUTPATIENT
Start: 2023-10-16

## 2023-10-16 RX ORDER — LABETALOL HYDROCHLORIDE 5 MG/ML
5 INJECTION, SOLUTION INTRAVENOUS
Status: DISCONTINUED | OUTPATIENT
Start: 2023-10-16 | End: 2023-10-16 | Stop reason: HOSPADM

## 2023-10-16 RX ORDER — HYDROCODONE BITARTRATE AND ACETAMINOPHEN 5; 325 MG/1; MG/1
1 TABLET ORAL ONCE AS NEEDED
Status: DISCONTINUED | OUTPATIENT
Start: 2023-10-16 | End: 2023-10-16 | Stop reason: HOSPADM

## 2023-10-16 RX ADMIN — DEXAMETHASONE SODIUM PHOSPHATE 4 MG: 4 INJECTION, SOLUTION INTRA-ARTICULAR; INTRALESIONAL; INTRAMUSCULAR; INTRAVENOUS; SOFT TISSUE at 09:57

## 2023-10-16 RX ADMIN — FENTANYL CITRATE 50 MCG: 50 INJECTION, SOLUTION INTRAMUSCULAR; INTRAVENOUS at 09:07

## 2023-10-16 RX ADMIN — KETOROLAC TROMETHAMINE 15 MG: 30 INJECTION, SOLUTION INTRAMUSCULAR; INTRAVENOUS at 09:57

## 2023-10-16 RX ADMIN — ROCURONIUM BROMIDE 40 MG: 10 SOLUTION INTRAVENOUS at 08:54

## 2023-10-16 RX ADMIN — ONDANSETRON 4 MG: 2 INJECTION INTRAMUSCULAR; INTRAVENOUS at 09:57

## 2023-10-16 RX ADMIN — PROPOFOL 50 MG: 10 INJECTION, EMULSION INTRAVENOUS at 09:21

## 2023-10-16 RX ADMIN — ROCURONIUM BROMIDE 10 MG: 10 SOLUTION INTRAVENOUS at 09:04

## 2023-10-16 RX ADMIN — Medication 3 MG: at 10:01

## 2023-10-16 RX ADMIN — ACETAMINOPHEN 1000 MG: 500 TABLET, FILM COATED ORAL at 07:52

## 2023-10-16 RX ADMIN — FENTANYL CITRATE 50 MCG: 50 INJECTION, SOLUTION INTRAMUSCULAR; INTRAVENOUS at 08:54

## 2023-10-16 RX ADMIN — SODIUM CHLORIDE, POTASSIUM CHLORIDE, SODIUM LACTATE AND CALCIUM CHLORIDE 1000 ML: 600; 310; 30; 20 INJECTION, SOLUTION INTRAVENOUS at 07:26

## 2023-10-16 RX ADMIN — SODIUM CHLORIDE 2 MG: 900 INJECTION INTRAVENOUS at 08:58

## 2023-10-16 RX ADMIN — PROPOFOL 130 MG: 10 INJECTION, EMULSION INTRAVENOUS at 08:54

## 2023-10-16 RX ADMIN — GLYCOPYRROLATE 0.2 MG: 0.2 INJECTION INTRAMUSCULAR; INTRAVENOUS at 10:01

## 2023-10-16 RX ADMIN — HYDROMORPHONE HYDROCHLORIDE 1 MG: 2 INJECTION, SOLUTION INTRAMUSCULAR; INTRAVENOUS; SUBCUTANEOUS at 09:23

## 2023-10-16 RX ADMIN — HYDROMORPHONE HYDROCHLORIDE 1 MG: 2 INJECTION, SOLUTION INTRAMUSCULAR; INTRAVENOUS; SUBCUTANEOUS at 09:16

## 2023-10-16 RX ADMIN — LIDOCAINE HYDROCHLORIDE 100 MG: 20 INJECTION, SOLUTION EPIDURAL; INFILTRATION; INTRACAUDAL; PERINEURAL at 08:54

## 2023-10-16 RX ADMIN — ROCURONIUM BROMIDE 10 MG: 10 SOLUTION INTRAVENOUS at 09:09

## 2023-10-16 NOTE — ANESTHESIA PREPROCEDURE EVALUATION
Anesthesia Evaluation     Patient summary reviewed and Nursing notes reviewed   NPO Solid Status: > 8 hours  NPO Liquid Status: > 8 hours           Airway   Mallampati: I  No difficulty expected  Dental    (+) edentulous    Pulmonary    (+) COPD, asthma,  (-) not a smoker  Cardiovascular   Exercise tolerance: good (4-7 METS)        Neuro/Psych  (+) TIA  GI/Hepatic/Renal/Endo    (+) morbid obesity  (-) liver disease, no renal disease, diabetes    Musculoskeletal     Abdominal    Substance History      OB/GYN          Other                      Anesthesia Plan    ASA 3     general     intravenous induction     Anesthetic plan, risks, benefits, and alternatives have been provided, discussed and informed consent has been obtained with: patient.    CODE STATUS:

## 2023-10-16 NOTE — ANESTHESIA PROCEDURE NOTES
Airway  Date/Time: 10/16/2023 8:56 AM  Airway not difficult    General Information and Staff    Patient location during procedure: OR  CRNA/CAA: Bret Adkins CRNA    Indications and Patient Condition  Indications for airway management: airway protection    Preoxygenated: yes  Mask difficulty assessment: 0 - not attempted    Final Airway Details  Final airway type: endotracheal airway      Successful airway: ETT  Cuffed: yes   Successful intubation technique: direct laryngoscopy  Endotracheal tube insertion site: oral  Blade: Mehrdad  Blade size: 3  ETT size (mm): 7.5  Cormack-Lehane Classification: grade I - full view of glottis  Placement verified by: chest auscultation and capnometry   Cuff volume (mL): 6  Measured from: lips  ETT/EBT  to lips (cm): 21  Number of attempts at approach: 1  Assessment: lips, teeth, and gum same as pre-op and atraumatic intubation    Additional Comments  ATRAUMATIC INTUBATION

## 2023-10-16 NOTE — ANESTHESIA POSTPROCEDURE EVALUATION
Patient: Riley Up    Procedure Summary       Date: 10/16/23 Room / Location: [facility] OR  /  [facility] OR    Anesthesia Start: 0849 Anesthesia Stop: 1025    Procedure: CHOLECYSTECTOMY LAPAROSCOPIC INTRAOPERATIVE CHOLANGIOGRAM (Abdomen) Diagnosis:       Calculus of gallbladder with acute on chronic cholecystitis without obstruction      Non-recurrent bilateral inguinal hernia without obstruction or gangrene      (Calculus of gallbladder with acute on chronic cholecystitis without obstruction [K80.12])      (Non-recurrent bilateral inguinal hernia without obstruction or gangrene [K40.20])    Surgeons: David Talamantes MD Provider: Bret Adkins CRNA    Anesthesia Type: general ASA Status: 3            Anesthesia Type: general    Vitals  Vitals Value Taken Time   /73 10/16/23 1135   Temp 98 °F (36.7 °C) 10/16/23 1140   Pulse 60 10/16/23 1140   Resp 15 10/16/23 1140   SpO2 95 % 10/16/23 1140           Post Anesthesia Care and Evaluation    Patient location during evaluation: PACU  Patient participation: complete - patient participated  Level of consciousness: awake and alert  Pain management: adequate    Airway patency: patent  Anesthetic complications: No anesthetic complications  PONV Status: none  Cardiovascular status: acceptable and hemodynamically stable  Respiratory status: acceptable  Hydration status: acceptable    Comments: Blood pressure 135/82, pulse 63, temperature 98 °F (36.7 °C), temperature source Temporal, resp. rate 16, SpO2 92%.    Patient discharged from PACU based upon Harpreet score. Please see RN notes for further details

## 2023-10-16 NOTE — OP NOTE
CHOLECYSTECTOMY LAPAROSCOPIC INTRAOPERATIVE CHOLANGIOGRAM  Procedure Note    Riley Up  10/16/2023    Pre-op Diagnosis:   Calculus of gallbladder with acute on chronic cholecystitis without obstruction [K80.12]  Non-recurrent bilateral inguinal hernia without obstruction or gangrene [K40.20]    Post-op Diagnosis:     Post-Op Diagnosis Codes:     * Calculus of gallbladder with acute on chronic cholecystitis without obstruction [K80.12]     * Non-recurrent bilateral inguinal hernia without obstruction or gangrene [K40.20]    Procedure/CPT® Codes:      Procedure(s):  CHOLECYSTECTOMY LAPAROSCOPIC INTRAOPERATIVE CHOLANGIOGRAM    Surgeon(s):  David Talamantes MD        Anesthesia: General    Staff:   Specimens       ID Source Type Tests Collected By Collected At Frozen?    A Gallbladder Tissue TISSUE PATHOLOGY EXAM   David Talamantes MD 10/16/23 0957 No    Description: GALLBLADDER AND CONTENTS            Estimated Blood Loss: 10 cc    Specimens:                ID Type Source Tests Collected by Time   A : GALLBLADDER AND CONTENTS Tissue Gallbladder TISSUE PATHOLOGY EXAM David Talamantes MD 10/16/2023 0957         Drains: There were no drains    Indications: Mr. Up is a 58y.o. who is here for evaluation of increasingly symptomatic cholelithiasis.  He is noted to have right-sided discomfort and presented to the emergency room recently with this abdominal pain CT scan was completed showing question of stones but ultrasound showing sludge and small gallstones noted by ultrasound on 31 May.  With these increasingly symptomatic stones he desires to have surgical intervention and plan laparoscopic cholecystectomy and treatment the above problem on 20 June.     We initially planned surgery on 20 June but insurance problems arose for the patient to put this off until now, now he continues with this midepigastric right upper quadrant pain also occasional to his back pain he is also noted to have some swelling  in his lower abdomen and has increasing the symptomatic hernias.  He is advised that he does have bilateral inguinal hernia but cannot look toward repair of these at the same time as his gallbladder he desires cholecystectomy we will look toward laparoscopic cholecystectomy and treatment of the above problem on 16 October.  Risk and benefits discussed he appears to understand and gives his informed consent for surgery.       Findings: Laparoscopic exploration, fat surrounded gallbladder, laparoscopic cholecystectomy completed, cholangiograms completed and normal, blood loss less than 10 cc.    Complications: No complications blood loss less than 10 cc    Procedure: Patient was placed in a supine position in the surgical suite and after a timeout had been completed  the area was scrubbed prepped and draped with alcohol and Betadine a Ioban was applied.  Following this a transverse skin incision was completed in the upper abdomen incising through the skin and subcutaneous taste tissue to the fascia.  Once of the fascia 2-0 Vicryl stay sutures were placed superior and inferior to the planned transverse incision.  An incision was completed with a 15 blade incising through the fascia and out muscle-splitting technique was completed using [place] clamps and dissecting in a muscle-splitting technique down to the peritoneum.  The peritoneum was opened and entered a blunt trocar 10 mm port was placed in this area and insufflation with CO2 was completed to maintain the abdominal pressure between 10 and 14 mm of pressure.  This accomplished under direct visualization 3  5 mm trochars were placed one in the right mid abdomen and 2 in the upper abdomen.  With the 3, 5 mm ports and one 10 the gallbladders and grasped with the right lateral port and elevated toward the right shoulder.  The adhesions at the rinku hepatis was taken down sharply and as well as with Bovie electrocautery and dissection was completed at the rinku  hepatis.  With dissection completed the critical angle was visualized with the cystic artery and cystic duct with this visualized the cystic artery was then clipped ×3 proximally and incised distally further dissection around the infundibulum was completed and a clip was placed across the infundibulum incision into the infundibulum was completed through which a cholangiogram was brought into the field and cholangiography obtained.  There is free flow into the left and right hepatic radical, hepatic duct, bile duct and free flow into the duodenum.  With this completed no other abnormalities noted the Cholangiocath was removed the cystic duct was then transected the infundibulum was controlled using 1-0 PDS loop and the cystic duct was controlled using 2-0 PDS loops and one clip distal to this.  With this completed the gallbladder was slowly and tediously removed from the infrahepatic substance prominent venous and arterial tributaries were noted and a single clip was applied across these.  Ultimately the gallbladder was able to be fully removed.  With this completed the bed was evaluated there is no bleeding from the bed and full control of the cystic duct and cystic artery the gallbladder was then grasped and placed in the Endo Catch  and removed from the right midepigastric port.  There is no spillage of bile or stones and this removal and having completed this the excess gas was relieved the right midepigastric port was closed using 4 figure-of-eight sutures of 0 Vicryl the deep dermis was reapproximated using simple inverted interrupted sutures of 3-0 Vicryl and skin was enclosed using running subcuticular suture of 4-0 Vicryl.  The skin was injected with half percent Marcaine with epinephrine a total of 30 mL used the 5 mm trochars sites were then closed using simple inverted interrupted sutures of 4-0 Vicryl and once completed the area was cleaned with saline Mastisol Steri-Strips 2 x 2 and Tegaderm applied.   Patient was then extubated and transferred to the recovery room in good condition and we supplied back pressure over the right midepigastric port until the patient was extubated to reduce any increased tension on the larger trocar site.          David Talamantes MD     Date: 10/16/2023  Time: 10:28 CDT    Part of this note may be an electronic transcription/translation of spoken language to printed text using the Dragon Dictation System.

## 2023-10-18 LAB
CYTO UR: NORMAL
LAB AP CASE REPORT: NORMAL
Lab: NORMAL
PATH REPORT.FINAL DX SPEC: NORMAL
PATH REPORT.GROSS SPEC: NORMAL

## 2023-10-31 ENCOUNTER — OFFICE VISIT (OUTPATIENT)
Dept: SURGERY | Facility: CLINIC | Age: 58
End: 2023-10-31
Payer: MEDICARE

## 2023-10-31 VITALS
HEART RATE: 112 BPM | WEIGHT: 235 LBS | HEIGHT: 66 IN | OXYGEN SATURATION: 90 % | BODY MASS INDEX: 37.77 KG/M2 | RESPIRATION RATE: 14 BRPM | SYSTOLIC BLOOD PRESSURE: 146 MMHG | DIASTOLIC BLOOD PRESSURE: 71 MMHG

## 2023-10-31 DIAGNOSIS — K80.12 CALCULUS OF GALLBLADDER WITH ACUTE ON CHRONIC CHOLECYSTITIS WITHOUT OBSTRUCTION: Primary | ICD-10-CM

## 2023-10-31 DIAGNOSIS — K40.20 NON-RECURRENT BILATERAL INGUINAL HERNIA WITHOUT OBSTRUCTION OR GANGRENE: ICD-10-CM

## 2023-10-31 PROCEDURE — 1159F MED LIST DOCD IN RCRD: CPT | Performed by: SPECIALIST

## 2023-10-31 PROCEDURE — 1160F RVW MEDS BY RX/DR IN RCRD: CPT | Performed by: SPECIALIST

## 2023-10-31 PROCEDURE — 99024 POSTOP FOLLOW-UP VISIT: CPT | Performed by: SPECIALIST

## 2023-10-31 NOTE — PROGRESS NOTES
Office Established Patient Note:     Referring Provider: Emergency room    Chief complaint status post laparoscopic cholecystectomy 10/16/2023    Subjective .     History of present illness:  Riley Up is a 58 y.o. male  who is here for evaluation of increasingly symptomatic cholelithiasis.  He is noted to have right-sided discomfort and presented to the emergency room recently with this abdominal pain CT scan was completed showing question of stones but ultrasound showing sludge and small gallstones noted by ultrasound on 31 May.  With these increasingly symptomatic stones he desires to have surgical intervention and plan laparoscopic cholecystectomy and treatment the above problem on 20 June.     We initially planned surgery on 20 June but insurance problems arose for the patient to put this off until now, now he continues with this midepigastric right upper quadrant pain also occasional to his back pain he is also noted to have some swelling in his lower abdomen and has increasing the symptomatic hernias.  He is advised that he does have bilateral inguinal hernia but cannot look toward repair of these at the same time as his gallbladder he desires cholecystectomy we will look toward laparoscopic cholecystectomy and treatment of the above problem on 16 October.  Risk and benefits discussed he appears to understand and gives his informed consent for surgery.        Findings: Laparoscopic exploration, fat surrounded gallbladder, laparoscopic cholecystectomy completed, cholangiograms completed and normal, blood loss less than 10 cc.       Currently 1 week out from laparoscopic cholecystectomy he is increasing his diet increasing his activity minimal incisional pain.  Previous discomfort that he had has resolved.  No nausea no vomiting.  Respirations clear and equal        History  Past Medical History:   Diagnosis Date    Asthma     COPD (chronic obstructive pulmonary disease)     Inguinal hernia    ,    Past Surgical History:   Procedure Laterality Date    BICEPS TENDON REPAIR Right     CHOLECYSTECTOMY WITH INTRAOPERATIVE CHOLANGIOGRAM N/A 10/16/2023    Procedure: CHOLECYSTECTOMY LAPAROSCOPIC INTRAOPERATIVE CHOLANGIOGRAM;  Surgeon: David Talamantes MD;  Location: [facility];  Service: General;  Laterality: N/A;    HERNIA REPAIR      KNEE ACL RECONSTRUCTION Bilateral     KNEE ARTHROSCOPY Bilateral     TEETH EXTRACTION      TOTAL HIP ARTHROPLASTY Bilateral     right hip repeated    TOTAL SHOULDER REPLACEMENT Right     UMBILICAL HERNIA REPAIR     , No family history on file.,   Social History     Tobacco Use    Smoking status: Never    Smokeless tobacco: Never   Vaping Use    Vaping Use: Never used   Substance Use Topics    Alcohol use: Yes     Comment: fitth of alcohol a week    Drug use: Never   , (Not in a hospital admission)   and Allergies:  Prozac [fluoxetine]    Current Outpatient Medications:     albuterol sulfate  (90 Base) MCG/ACT inhaler, Inhale 2 puffs Every 4 (Four) Hours As Needed for Wheezing., Disp: , Rfl:     ASPIRIN PO, Take 81 mg by mouth Daily., Disp: , Rfl:     dicyclomine (BENTYL) 20 MG tablet, Take 1 tablet by mouth Every 8 (Eight) Hours As Needed for Abdominal Cramping., Disp: 10 tablet, Rfl: 0    fluticasone (FLOVENT HFA) 220 MCG/ACT inhaler, Inhale 2 puffs 2 (Two) Times a Day., Disp: , Rfl:     HYDROcodone-acetaminophen (NORCO) 5-325 MG per tablet, Take 1 tablet by mouth Every 4 (Four) Hours As Needed for Mild Pain for up to 15 doses., Disp: 15 tablet, Rfl: 0    ipratropium-albuterol (DUO-NEB) 0.5-2.5 mg/3 ml nebulizer, Take 3 mL by nebulization Every 4 (Four) Hours As Needed for Wheezing (albuterol as needed in duoneb as well)., Disp: , Rfl:     ketorolac (TORADOL) 10 MG tablet, Take 1 tablet by mouth Every 6 (Six) Hours As Needed for Moderate Pain for up to 15 doses., Disp: 15 tablet, Rfl: 1    methylcellulose (Citrucel) oral powder, Take 2 application  by mouth Daily for 30  doses., Disp: 60 g, Rfl: 6    montelukast (SINGULAIR) 10 MG tablet, Take 1 tablet by mouth Every Night., Disp: , Rfl:     ondansetron (Zofran) 8 MG tablet, Take 0.5 tablets by mouth Every 8 (Eight) Hours As Needed for Nausea or Vomiting for up to 5 doses., Disp: 5 tablet, Rfl: 1    raNITIdine (ZANTAC) 300 MG tablet, Take 1 tablet by mouth 2 (Two) Times a Day., Disp: , Rfl:     tiZANidine (ZANAFLEX) 4 MG tablet, Take 1 tablet by mouth Every 8 (Eight) Hours As Needed., Disp: , Rfl:     Objective     Vital Signs   There were no vitals taken for this visit.     Physical Exam:  Respirations clear and equal    Cardio vascular exam regular rate and rhythm    Abdomen is soft, flat, nontender, nondistended.  Normal healing ridge no sign of any infection, pathology shows chronic cholecystitis.        Results Review:  Result Review :  Lab Results   Component Value Date    FINALDX  10/16/2023     Gallbladder, cholecystectomy:  A.  Chronic cholecystitis, mild (acalculous).  B.  No histologic evidence of malignancy.      GROSSDES  10/16/2023     1. Gallbladder.   Received in a formalin filled container labeled with the patient's name, date of birth, and designated gallbladder.  The specimen consists of an intact gallbladder measuring 9.5 x 3.5 x 3.3 cm.  The serosal surface is blue-green, smooth and glistening.  The cystic duct appears patent.  The gallbladder wall averages 0.1 cm in thickness, the mucosa is yellow-brown and velvety.  The contents of the gallbladder wall and the specimen container are filtered and no stones are identified.  Representative sections of gallbladder wall and the cystic duct resection margin are submitted in block 1A.                Assessment & Plan     Status post laparoscopic cholecystectomy and cholangiography, presently doing well he will be discharged to follow-up with me in 1 month CMP to be completed he also has bilateral hernias he states and these will need to be addressed at some  point.    Discussed BMI elevation and need to move toward a reduced BMI for health concerns he appears to understand he is a non smoker and his meds were reviewed.      David Talamantes MD  10/31/23  12:16 CDT

## 2023-11-27 NOTE — H&P (VIEW-ONLY)
Office Established Patient Note:     Referring Provider: Emergency room    Chief complaint follow-up laparoscopic cholecystectomy 10/16/2023    Subjective .     History of present illness:  Riley Up is a 58 y.o. male  who is here for evaluation of increasingly symptomatic cholelithiasis.  He is noted to have right-sided discomfort and presented to the emergency room recently with this abdominal pain CT scan was completed showing question of stones but ultrasound showing sludge and small gallstones noted by ultrasound on 31 May.  With these increasingly symptomatic stones he desires to have surgical intervention and plan laparoscopic cholecystectomy and treatment the above problem on 20 June.     We initially planned surgery on 20 June but insurance problems arose for the patient to put this off until now, now he continues with this midepigastric right upper quadrant pain also occasional to his back pain he is also noted to have some swelling in his lower abdomen and has increasing the symptomatic hernias.  He is advised that he does have bilateral inguinal hernia but cannot look toward repair of these at the same time as his gallbladder he desires cholecystectomy we will look toward laparoscopic cholecystectomy and treatment of the above problem on 16 October.  Risk and benefits discussed he appears to understand and gives his informed consent for surgery.        Findings: Laparoscopic exploration, fat surrounded gallbladder, laparoscopic cholecystectomy completed, cholangiograms completed and normal, blood loss less than 10 cc.        Currently 1 week out from laparoscopic cholecystectomy he is increasing his diet increasing his activity minimal incisional pain.  Previous discomfort that he had has resolved.  No nausea no vomiting.  Respirations clear and equal    He is doing well 1 month out from surgery, but he has had a increasingly symptomatic bilateral inguinal hernia at the right is much larger  than the left he desires to have surgical intervention and treatment of this, he is on no blood thinner, and has increasingly symptomatic from his hernias.  We will look toward laparoscopic bilateral preperitoneal hernia on 30 November risk and benefits discussed he appears to understand and gives his informed consent.    History  Past Medical History:   Diagnosis Date    Asthma     COPD (chronic obstructive pulmonary disease)     Inguinal hernia    ,   Past Surgical History:   Procedure Laterality Date    BICEPS TENDON REPAIR Right     CHOLECYSTECTOMY WITH INTRAOPERATIVE CHOLANGIOGRAM N/A 10/16/2023    Procedure: CHOLECYSTECTOMY LAPAROSCOPIC INTRAOPERATIVE CHOLANGIOGRAM;  Surgeon: David Talamantes MD;  Location: [facility];  Service: General;  Laterality: N/A;    HERNIA REPAIR      KNEE ACL RECONSTRUCTION Bilateral     KNEE ARTHROSCOPY Bilateral     TEETH EXTRACTION      TOTAL HIP ARTHROPLASTY Bilateral     right hip repeated    TOTAL SHOULDER REPLACEMENT Right     UMBILICAL HERNIA REPAIR     , No family history on file.,   Social History     Tobacco Use    Smoking status: Never    Smokeless tobacco: Never   Vaping Use    Vaping Use: Never used   Substance Use Topics    Alcohol use: Yes     Comment: fitth of alcohol a week    Drug use: Never   , (Not in a hospital admission)   and Allergies:  Prozac [fluoxetine]    Current Outpatient Medications:     albuterol sulfate  (90 Base) MCG/ACT inhaler, Inhale 2 puffs Every 4 (Four) Hours As Needed for Wheezing., Disp: , Rfl:     ASPIRIN PO, Take 81 mg by mouth Daily., Disp: , Rfl:     dicyclomine (BENTYL) 20 MG tablet, Take 1 tablet by mouth Every 8 (Eight) Hours As Needed for Abdominal Cramping., Disp: 10 tablet, Rfl: 0    fluticasone (FLOVENT HFA) 220 MCG/ACT inhaler, Inhale 2 puffs 2 (Two) Times a Day., Disp: , Rfl:     HYDROcodone-acetaminophen (NORCO) 5-325 MG per tablet, Take 1 tablet by mouth Every 4 (Four) Hours As Needed for Mild Pain for up to 15 doses.,  Disp: 15 tablet, Rfl: 0    ipratropium-albuterol (DUO-NEB) 0.5-2.5 mg/3 ml nebulizer, Take 3 mL by nebulization Every 4 (Four) Hours As Needed for Wheezing (albuterol as needed in duoneb as well)., Disp: , Rfl:     ketorolac (TORADOL) 10 MG tablet, Take 1 tablet by mouth Every 6 (Six) Hours As Needed for Moderate Pain for up to 15 doses., Disp: 15 tablet, Rfl: 1    montelukast (SINGULAIR) 10 MG tablet, Take 1 tablet by mouth Every Night., Disp: , Rfl:     ondansetron (Zofran) 8 MG tablet, Take 0.5 tablets by mouth Every 8 (Eight) Hours As Needed for Nausea or Vomiting for up to 5 doses., Disp: 5 tablet, Rfl: 1    raNITIdine (ZANTAC) 300 MG tablet, Take 1 tablet by mouth 2 (Two) Times a Day., Disp: , Rfl:     tiZANidine (ZANAFLEX) 4 MG tablet, Take 1 tablet by mouth Every 8 (Eight) Hours As Needed., Disp: , Rfl:     Objective     Vital Signs   There were no vitals taken for this visit.     Physical Exam:  Respirations clear and equal    Cardiovascular exam regular rate and rhythm    Abdomen is soft, obese, signs of striae where large amounts of weight has been lost, he has no hernias from his previous laparoscopic exploration, he has a very large right inguinal hernia that is reducible, and a small left inguinal hernia, testicles are descended on the right somewhat high riding on the left.    Extremities within normal limits.    Results Review:  Result Review :  Lab Results   Component Value Date    FINALDX  10/16/2023     Gallbladder, cholecystectomy:  A.  Chronic cholecystitis, mild (acalculous).  B.  No histologic evidence of malignancy.      GROSSDES  10/16/2023     1. Gallbladder.   Received in a formalin filled container labeled with the patient's name, date of birth, and designated gallbladder.  The specimen consists of an intact gallbladder measuring 9.5 x 3.5 x 3.3 cm.  The serosal surface is blue-green, smooth and glistening.  The cystic duct appears patent.  The gallbladder wall averages 0.1 cm in  thickness, the mucosa is yellow-brown and velvety.  The contents of the gallbladder wall and the specimen container are filtered and no stones are identified.  Representative sections of gallbladder wall and the cystic duct resection margin are submitted in block 1A.                Assessment & Plan       Patient with hypertension, previous history of obesity, now down to a BMI of 37, previous cholecystectomy, osteoarthritis, now with increasingly symptomatic bilateral inguinal hernia.  He is for laparoscopic bilateral preperitoneal hernia repair and treatment of the above problem risk and benefits discussed with full knowledge of this and apparent understanding he gives his informed consent for surgery.  Plan for surgical intervention on 30 November, 1 bottle of magnesium citrate on 29 November.  Discussed BMI elevation and need to move toward a reduced BMI for health concerns he appears to understand he is a non smoker and his meds were reviewed.      David Talamantes MD  11/27/23  10:12 CST

## 2023-11-27 NOTE — PROGRESS NOTES
Office Established Patient Note:     Referring Provider: Emergency room    Chief complaint follow-up laparoscopic cholecystectomy 10/16/2023    Subjective .     History of present illness:  Riley Up is a 58 y.o. male  who is here for evaluation of increasingly symptomatic cholelithiasis.  He is noted to have right-sided discomfort and presented to the emergency room recently with this abdominal pain CT scan was completed showing question of stones but ultrasound showing sludge and small gallstones noted by ultrasound on 31 May.  With these increasingly symptomatic stones he desires to have surgical intervention and plan laparoscopic cholecystectomy and treatment the above problem on 20 June.     We initially planned surgery on 20 June but insurance problems arose for the patient to put this off until now, now he continues with this midepigastric right upper quadrant pain also occasional to his back pain he is also noted to have some swelling in his lower abdomen and has increasing the symptomatic hernias.  He is advised that he does have bilateral inguinal hernia but cannot look toward repair of these at the same time as his gallbladder he desires cholecystectomy we will look toward laparoscopic cholecystectomy and treatment of the above problem on 16 October.  Risk and benefits discussed he appears to understand and gives his informed consent for surgery.        Findings: Laparoscopic exploration, fat surrounded gallbladder, laparoscopic cholecystectomy completed, cholangiograms completed and normal, blood loss less than 10 cc.        Currently 1 week out from laparoscopic cholecystectomy he is increasing his diet increasing his activity minimal incisional pain.  Previous discomfort that he had has resolved.  No nausea no vomiting.  Respirations clear and equal    He is doing well 1 month out from surgery, but he has had a increasingly symptomatic bilateral inguinal hernia at the right is much larger  than the left he desires to have surgical intervention and treatment of this, he is on no blood thinner, and has increasingly symptomatic from his hernias.  We will look toward laparoscopic bilateral preperitoneal hernia on 30 November risk and benefits discussed he appears to understand and gives his informed consent.    History  Past Medical History:   Diagnosis Date    Asthma     COPD (chronic obstructive pulmonary disease)     Inguinal hernia    ,   Past Surgical History:   Procedure Laterality Date    BICEPS TENDON REPAIR Right     CHOLECYSTECTOMY WITH INTRAOPERATIVE CHOLANGIOGRAM N/A 10/16/2023    Procedure: CHOLECYSTECTOMY LAPAROSCOPIC INTRAOPERATIVE CHOLANGIOGRAM;  Surgeon: David Talamantes MD;  Location: [facility];  Service: General;  Laterality: N/A;    HERNIA REPAIR      KNEE ACL RECONSTRUCTION Bilateral     KNEE ARTHROSCOPY Bilateral     TEETH EXTRACTION      TOTAL HIP ARTHROPLASTY Bilateral     right hip repeated    TOTAL SHOULDER REPLACEMENT Right     UMBILICAL HERNIA REPAIR     , No family history on file.,   Social History     Tobacco Use    Smoking status: Never    Smokeless tobacco: Never   Vaping Use    Vaping Use: Never used   Substance Use Topics    Alcohol use: Yes     Comment: fitth of alcohol a week    Drug use: Never   , (Not in a hospital admission)   and Allergies:  Prozac [fluoxetine]    Current Outpatient Medications:     albuterol sulfate  (90 Base) MCG/ACT inhaler, Inhale 2 puffs Every 4 (Four) Hours As Needed for Wheezing., Disp: , Rfl:     ASPIRIN PO, Take 81 mg by mouth Daily., Disp: , Rfl:     dicyclomine (BENTYL) 20 MG tablet, Take 1 tablet by mouth Every 8 (Eight) Hours As Needed for Abdominal Cramping., Disp: 10 tablet, Rfl: 0    fluticasone (FLOVENT HFA) 220 MCG/ACT inhaler, Inhale 2 puffs 2 (Two) Times a Day., Disp: , Rfl:     HYDROcodone-acetaminophen (NORCO) 5-325 MG per tablet, Take 1 tablet by mouth Every 4 (Four) Hours As Needed for Mild Pain for up to 15 doses.,  Disp: 15 tablet, Rfl: 0    ipratropium-albuterol (DUO-NEB) 0.5-2.5 mg/3 ml nebulizer, Take 3 mL by nebulization Every 4 (Four) Hours As Needed for Wheezing (albuterol as needed in duoneb as well)., Disp: , Rfl:     ketorolac (TORADOL) 10 MG tablet, Take 1 tablet by mouth Every 6 (Six) Hours As Needed for Moderate Pain for up to 15 doses., Disp: 15 tablet, Rfl: 1    montelukast (SINGULAIR) 10 MG tablet, Take 1 tablet by mouth Every Night., Disp: , Rfl:     ondansetron (Zofran) 8 MG tablet, Take 0.5 tablets by mouth Every 8 (Eight) Hours As Needed for Nausea or Vomiting for up to 5 doses., Disp: 5 tablet, Rfl: 1    raNITIdine (ZANTAC) 300 MG tablet, Take 1 tablet by mouth 2 (Two) Times a Day., Disp: , Rfl:     tiZANidine (ZANAFLEX) 4 MG tablet, Take 1 tablet by mouth Every 8 (Eight) Hours As Needed., Disp: , Rfl:     Objective     Vital Signs   There were no vitals taken for this visit.     Physical Exam:  Respirations clear and equal    Cardiovascular exam regular rate and rhythm    Abdomen is soft, obese, signs of striae where large amounts of weight has been lost, he has no hernias from his previous laparoscopic exploration, he has a very large right inguinal hernia that is reducible, and a small left inguinal hernia, testicles are descended on the right somewhat high riding on the left.    Extremities within normal limits.    Results Review:  Result Review :  Lab Results   Component Value Date    FINALDX  10/16/2023     Gallbladder, cholecystectomy:  A.  Chronic cholecystitis, mild (acalculous).  B.  No histologic evidence of malignancy.      GROSSDES  10/16/2023     1. Gallbladder.   Received in a formalin filled container labeled with the patient's name, date of birth, and designated gallbladder.  The specimen consists of an intact gallbladder measuring 9.5 x 3.5 x 3.3 cm.  The serosal surface is blue-green, smooth and glistening.  The cystic duct appears patent.  The gallbladder wall averages 0.1 cm in  thickness, the mucosa is yellow-brown and velvety.  The contents of the gallbladder wall and the specimen container are filtered and no stones are identified.  Representative sections of gallbladder wall and the cystic duct resection margin are submitted in block 1A.                Assessment & Plan       Patient with hypertension, previous history of obesity, now down to a BMI of 37, previous cholecystectomy, osteoarthritis, now with increasingly symptomatic bilateral inguinal hernia.  He is for laparoscopic bilateral preperitoneal hernia repair and treatment of the above problem risk and benefits discussed with full knowledge of this and apparent understanding he gives his informed consent for surgery.  Plan for surgical intervention on 30 November, 1 bottle of magnesium citrate on 29 November.  Discussed BMI elevation and need to move toward a reduced BMI for health concerns he appears to understand he is a non smoker and his meds were reviewed.      David Talamantes MD  11/27/23  10:12 CST

## 2023-11-28 ENCOUNTER — OFFICE VISIT (OUTPATIENT)
Dept: SURGERY | Facility: CLINIC | Age: 58
End: 2023-11-28
Payer: MEDICARE

## 2023-11-28 VITALS — HEIGHT: 66 IN | WEIGHT: 235 LBS | BODY MASS INDEX: 37.77 KG/M2

## 2023-11-28 DIAGNOSIS — K40.20 NON-RECURRENT BILATERAL INGUINAL HERNIA WITHOUT OBSTRUCTION OR GANGRENE: ICD-10-CM

## 2023-11-28 DIAGNOSIS — K80.12 CALCULUS OF GALLBLADDER WITH ACUTE ON CHRONIC CHOLECYSTITIS WITHOUT OBSTRUCTION: Primary | ICD-10-CM

## 2023-11-28 RX ORDER — SODIUM CHLORIDE 9 MG/ML
100 INJECTION, SOLUTION INTRAVENOUS CONTINUOUS
OUTPATIENT
Start: 2023-11-28

## 2023-11-28 RX ORDER — ONDANSETRON 2 MG/ML
4 INJECTION INTRAMUSCULAR; INTRAVENOUS EVERY 6 HOURS PRN
OUTPATIENT
Start: 2023-11-28

## 2023-11-28 NOTE — PATIENT INSTRUCTIONS
I am glad you are doing well from your gallbladder surgery Mr. Up, we will look toward completing your hernia repair this coming Thursday, nothing to eat or drink after midnight and please take 1 bottle of magnesium citrate on Tuesday 12/12/23 hold your aspirin after 12/08/23   Surgery; Wednesday 12/13/23 arrive at 11:00 am  Pre-work; Wednesday; 12/06/23 arrive at 9:00 am (no fasting this day)  Check in at the main registration desk located at the main entrance of the hospital on both days.  NPO after midnight the night before surgery.  NO aspirin after 12/08/23

## 2023-11-29 ENCOUNTER — TELEPHONE (OUTPATIENT)
Dept: SURGERY | Facility: CLINIC | Age: 58
End: 2023-11-29
Payer: MEDICARE

## 2023-11-29 NOTE — TELEPHONE ENCOUNTER
Spoke with patient's spouse; surgery instructions including surgery date and pre-work date with arrival time given. Also advised to hold aspirin after 12/08/23. Patient's spouse voiced understanding. ANDRADE

## 2023-12-06 ENCOUNTER — PRE-ADMISSION TESTING (OUTPATIENT)
Dept: PREADMISSION TESTING | Facility: HOSPITAL | Age: 58
End: 2023-12-06
Payer: MEDICARE

## 2023-12-06 VITALS
HEIGHT: 65 IN | WEIGHT: 243.61 LBS | RESPIRATION RATE: 18 BRPM | OXYGEN SATURATION: 96 % | DIASTOLIC BLOOD PRESSURE: 93 MMHG | HEART RATE: 88 BPM | SYSTOLIC BLOOD PRESSURE: 190 MMHG | BODY MASS INDEX: 40.59 KG/M2

## 2023-12-06 DIAGNOSIS — K80.12 CALCULUS OF GALLBLADDER WITH ACUTE ON CHRONIC CHOLECYSTITIS WITHOUT OBSTRUCTION: ICD-10-CM

## 2023-12-06 LAB
ALBUMIN SERPL-MCNC: 4 G/DL (ref 3.5–5.2)
ALBUMIN/GLOB SERPL: 1.7 G/DL
ALP SERPL-CCNC: 89 U/L (ref 39–117)
ALT SERPL W P-5'-P-CCNC: 14 U/L (ref 1–41)
AMYLASE SERPL-CCNC: 36 U/L (ref 28–100)
ANION GAP SERPL CALCULATED.3IONS-SCNC: 6 MMOL/L (ref 5–15)
AST SERPL-CCNC: 18 U/L (ref 1–40)
BILIRUB SERPL-MCNC: 1.1 MG/DL (ref 0–1.2)
BUN SERPL-MCNC: 14 MG/DL (ref 6–20)
BUN/CREAT SERPL: 21.5 (ref 7–25)
CALCIUM SPEC-SCNC: 9.4 MG/DL (ref 8.6–10.5)
CHLORIDE SERPL-SCNC: 105 MMOL/L (ref 98–107)
CO2 SERPL-SCNC: 32 MMOL/L (ref 22–29)
CREAT SERPL-MCNC: 0.65 MG/DL (ref 0.76–1.27)
DEPRECATED RDW RBC AUTO: 54.8 FL (ref 37–54)
EGFRCR SERPLBLD CKD-EPI 2021: 110.6 ML/MIN/1.73
ERYTHROCYTE [DISTWIDTH] IN BLOOD BY AUTOMATED COUNT: 17.7 % (ref 12.3–15.4)
ERYTHROCYTE [SEDIMENTATION RATE] IN BLOOD: 5 MM/HR (ref 0–20)
GLOBULIN UR ELPH-MCNC: 2.3 GM/DL
GLUCOSE SERPL-MCNC: 98 MG/DL (ref 65–99)
HCT VFR BLD AUTO: 41.8 % (ref 37.5–51)
HGB BLD-MCNC: 12.4 G/DL (ref 13–17.7)
LIPASE SERPL-CCNC: 19 U/L (ref 13–60)
MCH RBC QN AUTO: 25.2 PG (ref 26.6–33)
MCHC RBC AUTO-ENTMCNC: 29.7 G/DL (ref 31.5–35.7)
MCV RBC AUTO: 85 FL (ref 79–97)
PLATELET # BLD AUTO: 282 10*3/MM3 (ref 140–450)
PMV BLD AUTO: 9.4 FL (ref 6–12)
POTASSIUM SERPL-SCNC: 3.9 MMOL/L (ref 3.5–5.2)
PROT SERPL-MCNC: 6.3 G/DL (ref 6–8.5)
RBC # BLD AUTO: 4.92 10*6/MM3 (ref 4.14–5.8)
SODIUM SERPL-SCNC: 143 MMOL/L (ref 136–145)
WBC NRBC COR # BLD AUTO: 5.57 10*3/MM3 (ref 3.4–10.8)

## 2023-12-06 PROCEDURE — 85027 COMPLETE CBC AUTOMATED: CPT

## 2023-12-06 PROCEDURE — 82150 ASSAY OF AMYLASE: CPT

## 2023-12-06 PROCEDURE — 80053 COMPREHEN METABOLIC PANEL: CPT

## 2023-12-06 PROCEDURE — 85652 RBC SED RATE AUTOMATED: CPT

## 2023-12-06 PROCEDURE — 83690 ASSAY OF LIPASE: CPT

## 2023-12-06 PROCEDURE — 36415 COLL VENOUS BLD VENIPUNCTURE: CPT

## 2023-12-06 RX ORDER — ERGOCALCIFEROL (VITAMIN D2) 10 MCG
400 TABLET ORAL DAILY
COMMUNITY

## 2023-12-06 NOTE — DISCHARGE INSTRUCTIONS
Before you come to the hospital        Arrival time: AS DIRECTED BY OFFICE     YOU MAY TAKE THE FOLLOWING MEDICATION(S) THE MORNING OF SURGERY WITH A SIP OF WATER: AS MD HAS INSTRUCTED           ALL OTHER HOME MEDICATION CHECK WITH YOUR PHYSICIAN (especially if   you are taking diabetes medicines or blood thinners)    Do not take any Erectile Dysfunction medications (EX: CIALIS, VIAGRA) 24 hours prior to surgery.      If you were given and instructed to use a germ- killing soap, use as directed the night before surgery and again the morning of surgery or as directed by your surgeon. (Use one-half of the bottle with each shower.)   See attached information for How to Use Chlorhexidine for Bathing if applicable.            Eating and drinking restrictions prior to scheduled arrival time    2 Hours before arrival time STOP   Drinking Clear liquids (water, black coffee-NO CREAM,  apple juice-no pulp)    Clear Liquids    Water and flavored water                                                                      Clear Fruit juices, such as cranberry juice and apple juice.  Black coffee (NO cream of any kind, including powdered).  Plain tea  Clear bouillon or broth.  Flavored gelatin.  Soda.  Gatorade or Powerade.    8 Hours before arrival time STOP   All food, full liquids, and dairy products  Full liquid examples  Juices that have pulp.  Frozen ice pops that contain fruit pieces.  Coffee with creamer  Milk.  Yogurt.    (It is extremely important that you follow these guidelines to prevent delay or cancelation of your procedure)                       MANAGING PAIN AFTER SURGERY    We know you are probably wondering what your pain will be like after surgery.  Following surgery it is unrealistic to expect you will not have pain.   Pain is how our bodies let us know that something is wrong or cautions us to be careful.  That said, our goal is to make your pain tolerable.    Methods we may use to treat your pain include  (oral or IV medications, PCAs, epidurals, nerve blocks, etc.)   While some procedures require IV pain medications for a short time after surgery, transitioning to pain medications by mouth allows for better management of pain.   Your nurse will encourage you to take oral pain medications whenever possible.  IV medications work almost immediately, but only last a short while.  Taking medications by mouth allows for a more constant level of medication in your blood stream for a longer period of time.      Once your pain is out of control it is harder to get back under control.  It is important you are aware when your next dose of pain medication is due.  If you are admitted, your nurse may write the time of your next dose on the white board in your room to help you remember.      We are interested in your pain and encourage you to inform us about aggravating factors during your visit.   Many times a simple repositioning every few hours can make a big difference.    If your physician says it is okay, do not let your pain prevent you from getting out of bed. Be sure to call your nurse for assistance prior to getting up so you do not fall.      Before surgery, please decide your tolerable pain goal.  These faces help describe the pain ratings we use on a 0-10 scale.   Be prepared to tell us your goal and whether or not you take pain or anxiety medications at home.          Preparing for Surgery  Preparing for surgery is an important part of your care. It can make things go more smoothly and help you avoid complications. The steps leading up to surgery may vary among hospitals. Follow all instructions given to you by your health care providers. Ask questions if you do not understand something. Talk about any concerns that you have.  Here are some questions to consider asking before your surgery:  If my surgery is not an emergency (is elective), when would be the best time to have the surgery?  What arrangements do I need  to make for work, home, or school?  What will my recovery be like? How long will it be before I can return to normal activities?  Will I need to prepare my home? Will I need to arrange care for me or my children?  Should I expect to have pain after surgery? What are my pain management options? Are there nonmedical options that I can try for pain?  Tell a health care provider about:  Any allergies you have.  All medicines you are taking, including vitamins, herbs, eye drops, creams, and over-the-counter medicines.  Any problems you or family members have had with anesthetic medicines.  Any blood disorders you have.  Any surgeries you have had.  Any medical conditions you have.  Whether you are pregnant or may be pregnant.  What are the risks?  The risks and complications of surgery depend on the specific procedure that you have. Discuss all the risks with your health care providers before your surgery. Ask about common surgical complications, which may include:  Infection.  Bleeding or a need for blood replacement (transfusion).  Allergic reactions to medicines.  Damage to surrounding nerves, tissues, or structures.  A blood clot.  Scarring.  Failure of the surgery to correct the problem.  Follow these instructions before the procedure:  Several days or weeks before your procedure  You may have a physical exam by your primary health care provider to make sure it is safe for you to have surgery.  You may have testing. This may include a chest X-ray, blood and urine tests, electrocardiogram (ECG), or other testing.  Ask your health care provider about:  Changing or stopping your regular medicines. This is especially important if you are taking diabetes medicines or blood thinners.  Taking medicines such as aspirin and ibuprofen. These medicines can thin your blood. Do not take these medicines unless your health care provider tells you to take them.  Taking over-the-counter medicines, vitamins, herbs, and  supplements.  Do not use any products that contain nicotine or tobacco, such as cigarettes and e-cigarettes. If you need help quitting, ask your health care provider.  Avoid alcohol.  Ask your health care provider if there are exercises you can do to prepare for surgery.  Eat a healthy diet.   Plan to have someone 18 years of age or older to take you home from the hospital. We will need to verify your ride on the morning of surgery if you are being discharged home on the same day. Tell your ride to be expecting a call from the hospital prior to your procedure.   Plan to have a responsible adult care for you for at least 24 hours after you leave the hospital or clinic. This is important.  The day before your procedure  You may be given antibiotic medicine to take by mouth to help prevent infection. Take it as told by your health care provider.  You may be asked to shower with a germ-killing soap.  Follow instructions from your health care provider about eating and drinking restrictions. This includes gum, mints and hard candy.  Pack comfortable clothes according to your procedure.   The day of your procedure  You may need to take another shower with a germ-killing soap before you leave home in the morning.  With a small sip of water, take only the medicines that you are told to take.  Remove all jewelry including rings.   Leave anything you consider valuable at home except hearing aids if needed.  You do not need to bring your home medications into the hospital.   Do not wear any makeup, nail polish, powder, deodorant, lotion, hair accessories, or anything on your skin or body except your clothes.  If you will be staying in the hospital, bring a case to hold your glasses, contacts, or dentures. You may also want to bring your robe and non-skid footwear.       (Do not use denture adhesives since you will be asked to remove them during  surgery).   If you wear oxygen at home, bring it with you the day of surgery.  If  instructed by your health care provider, bring your sleep apnea device with you on the day of your surgery (if this applies to you).  You may want to leave your suitcase and sleep apnea device in the car until after surgery.   Arrive at the hospital as scheduled.  Bring a friend or family member with you who can help to answer questions and be present while you meet with your health care provider.  At the hospital  When you arrive at the hospital:  Go to registration located at the main entrance of the hospital. You will be registered and given a beeper and a sticker sheet. Take the stickers to the Outpatient nurses desk and place in the black tray. This is to notify staff that you have arrived. Then return to the lobby to wait.   When your beeper lights up and vibrates proceed through the double doors, under the stairs, and a member of the Outpatient Surgery staff will escort you to your preoperative room.  You may have to wear compression sleeves. These help to prevent blood clots and reduce swelling in your legs.  An IV may be inserted into one of your veins.              In the operating room, you may be given one or more of the following:        A medicine to help you relax (sedative).        A medicine to numb the area (local anesthetic).        A medicine to make you fall asleep (general anesthetic).        A medicine that is injected into an area of your body to numb everything below the                      injection site (regional anesthetic).  You may be given an antibiotic through your IV to help prevent infection.  Your surgical site will be marked or identified.    Contact a health care provider if you:  Develop a fever of more than 100.4°F (38°C) or other feelings of illness during the 48 hours before your surgery.  Have symptoms that get worse.  Have questions or concerns about your surgery.  Summary  Preparing for surgery can make the procedure go more smoothly and lower your risk of  complications.  Before surgery, make a list of questions and concerns to discuss with your surgeon. Ask about the risks and possible complications.  In the days or weeks before your surgery, follow all instructions from your health care provider. You may need to stop smoking, avoid alcohol, follow eating restrictions, and change or stop your regular medicines.  Contact your surgeon if you develop a fever or other signs of illness during the few days before your surgery.  This information is not intended to replace advice given to you by your health care provider. Make sure you discuss any questions you have with your health care provider.  Document Revised: 12/21/2018 Document Reviewed: 10/23/2018  [employer] Patient Education © 2021 [employer]         How to Use Chlorhexidine Before Surgery  Chlorhexidine gluconate (CHG) is a germ-killing (antiseptic) solution that is used to clean the skin. It can get rid of the bacteria that normally live on the skin and can keep them away for about 24 hours. To clean your skin with CHG, you may be given:  A CHG solution to use in the shower or as part of a sponge bath.  A prepackaged cloth that contains CHG.  Cleaning your skin with CHG may help lower the risk for infection:  While you are staying in the intensive care unit of the hospital.  If you have a vascular access, such as a central line, to provide short-term or long-term access to your veins.  If you have a catheter to drain urine from your bladder.  If you are on a ventilator. A ventilator is a machine that helps you breathe by moving air in and out of your lungs.  After surgery.  What are the risks?  Risks of using CHG include:  A skin reaction.  Hearing loss, if CHG gets in your ears and you have a perforated eardrum.  Eye injury, if CHG gets in your eyes and is not rinsed out.  The CHG product catching fire.  Make sure that you avoid smoking and flames after applying CHG to your skin.  Do not use CHG:  If you have  a chlorhexidine allergy or have previously reacted to chlorhexidine.  On babies younger than 2 months of age.  How to use CHG solution  Use CHG only as told by your health care provider, and follow the instructions on the label.  Use the full amount of CHG as directed. Usually, this is one bottle.  During a shower    Follow these steps when using CHG solution during a shower (unless your health care provider gives you different instructions):  Start the shower.  Use your normal soap and shampoo to wash your face and hair.  Turn off the shower or move out of the shower stream.  Pour the CHG onto a clean washcloth. Do not use any type of brush or rough-edged sponge.  Starting at your neck, lather your body down to your toes. Make sure you follow these instructions:  If you will be having surgery, pay special attention to the part of your body where you will be having surgery. Scrub this area for at least 1 minute.  Do not use CHG on your head or face. If the solution gets into your ears or eyes, rinse them well with water.  Avoid your genital area.  Avoid any areas of skin that have broken skin, cuts, or scrapes.  Scrub your back and under your arms. Make sure to wash skin folds.  Let the lather sit on your skin for 1-2 minutes or as long as told by your health care provider.  Thoroughly rinse your entire body in the shower. Make sure that all body creases and crevices are rinsed well.  Dry off with a clean towel. Do not put any substances on your body afterward--such as powder, lotion, or perfume--unless you are told to do so by your health care provider. Only use lotions that are recommended by the .  Put on clean clothes or pajamas.  If it is the night before your surgery, sleep in clean sheets.     During a sponge bath  Follow these steps when using CHG solution during a sponge bath (unless your health care provider gives you different instructions):  Use your normal soap and shampoo to wash your face  and hair.  Pour the CHG onto a clean washcloth.  Starting at your neck, lather your body down to your toes. Make sure you follow these instructions:  If you will be having surgery, pay special attention to the part of your body where you will be having surgery. Scrub this area for at least 1 minute.  Do not use CHG on your head or face. If the solution gets into your ears or eyes, rinse them well with water.  Avoid your genital area.  Avoid any areas of skin that have broken skin, cuts, or scrapes.  Scrub your back and under your arms. Make sure to wash skin folds.  Let the lather sit on your skin for 1-2 minutes or as long as told by your health care provider.  Using a different clean, wet washcloth, thoroughly rinse your entire body. Make sure that all body creases and crevices are rinsed well.  Dry off with a clean towel. Do not put any substances on your body afterward--such as powder, lotion, or perfume--unless you are told to do so by your health care provider. Only use lotions that are recommended by the .  Put on clean clothes or pajamas.  If it is the night before your surgery, sleep in clean sheets.  How to use CHG prepackaged cloths  Only use CHG cloths as told by your health care provider, and follow the instructions on the label.  Use the CHG cloth on clean, dry skin.  Do not use the CHG cloth on your head or face unless your health care provider tells you to.  When washing with the CHG cloth:  Avoid your genital area.  Avoid any areas of skin that have broken skin, cuts, or scrapes.  Before surgery    Follow these steps when using a CHG cloth to clean before surgery (unless your health care provider gives you different instructions):  Using the CHG cloth, vigorously scrub the part of your body where you will be having surgery. Scrub using a back-and-forth motion for 3 minutes. The area on your body should be completely wet with CHG when you are done scrubbing.  Do not rinse. Discard the  cloth and let the area air-dry. Do not put any substances on the area afterward, such as powder, lotion, or perfume.  Put on clean clothes or pajamas.  If it is the night before your surgery, sleep in clean sheets.     For general bathing  Follow these steps when using CHG cloths for general bathing (unless your health care provider gives you different instructions).  Use a separate CHG cloth for each area of your body. Make sure you wash between any folds of skin and between your fingers and toes. Wash your body in the following order, switching to a new cloth after each step:  The front of your neck, shoulders, and chest.  Both of your arms, under your arms, and your hands.  Your stomach and groin area, avoiding the genitals.  Your right leg and foot.  Your left leg and foot.  The back of your neck, your back, and your buttocks.  Do not rinse. Discard the cloth and let the area air-dry. Do not put any substances on your body afterward--such as powder, lotion, or perfume--unless you are told to do so by your health care provider. Only use lotions that are recommended by the .  Put on clean clothes or pajamas.  Contact a health care provider if:  Your skin gets irritated after scrubbing.  You have questions about using your solution or cloth.  You swallow any chlorhexidine. Call your local poison control center (1-610.502.1700 in the [place]  Get help right away if:  Your eyes itch badly, or they become very red or swollen.  Your skin itches badly and is red or swollen.  Your hearing changes.  You have trouble seeing.  You have swelling or tingling in your mouth or throat.  You have trouble breathing.  These symptoms may represent a serious problem that is an emergency. Do not wait to see if the symptoms will go away. Get medical help right away. Call your local emergency services (119 in the [place] Do not drive yourself to the hospital.  Summary  Chlorhexidine gluconate (CHG) is a germ-killing  (antiseptic) solution that is used to clean the skin. Cleaning your skin with CHG may help to lower your risk for infection.  You may be given CHG to use for bathing. It may be in a bottle or in a prepackaged cloth to use on your skin. Carefully follow your health care provider's instructions and the instructions on the product label.  Do not use CHG if you have a chlorhexidine allergy.  Contact your health care provider if your skin gets irritated after scrubbing.  This information is not intended to replace advice given to you by your health care provider. Make sure you discuss any questions you have with your health care provider.  Document Revised: 04/17/2023 Document Reviewed: 02/28/2022  [employer] Patient Education © 2023 [employer]

## 2023-12-07 ENCOUNTER — TELEPHONE (OUTPATIENT)
Dept: SURGERY | Facility: CLINIC | Age: 58
End: 2023-12-07
Payer: MEDICARE

## 2023-12-12 ENCOUNTER — HOSPITAL ENCOUNTER (OUTPATIENT)
Facility: HOSPITAL | Age: 58
Setting detail: HOSPITAL OUTPATIENT SURGERY
Discharge: HOME | End: 2023-12-12
Attending: SPECIALIST | Admitting: SPECIALIST
Payer: MEDICARE

## 2023-12-12 ENCOUNTER — ANESTHESIA EVENT (OUTPATIENT)
Dept: PERIOP | Facility: HOSPITAL | Age: 58
End: 2023-12-12
Payer: MEDICARE

## 2023-12-12 ENCOUNTER — ANESTHESIA (OUTPATIENT)
Dept: PERIOP | Facility: HOSPITAL | Age: 58
End: 2023-12-12
Payer: MEDICARE

## 2023-12-12 VITALS
RESPIRATION RATE: 18 BRPM | TEMPERATURE: 97.5 F | DIASTOLIC BLOOD PRESSURE: 72 MMHG | SYSTOLIC BLOOD PRESSURE: 117 MMHG | OXYGEN SATURATION: 96 % | HEART RATE: 92 BPM

## 2023-12-12 DIAGNOSIS — K40.20 NON-RECURRENT BILATERAL INGUINAL HERNIA WITHOUT OBSTRUCTION OR GANGRENE: ICD-10-CM

## 2023-12-12 PROCEDURE — 25010000002 DEXAMETHASONE PER 1 MG

## 2023-12-12 PROCEDURE — 25010000002 VASOPRESSIN 20 UNIT/ML SOLUTION

## 2023-12-12 PROCEDURE — 25010000002 FENTANYL CITRATE (PF) 100 MCG/2ML SOLUTION

## 2023-12-12 PROCEDURE — 25010000002 ONDANSETRON PER 1 MG

## 2023-12-12 PROCEDURE — 25010000002 KETOROLAC TROMETHAMINE PER 15 MG

## 2023-12-12 PROCEDURE — 25010000002 VANCOMYCIN 1 G RECONSTITUTED SOLUTION 1 EACH VIAL: Performed by: SPECIALIST

## 2023-12-12 PROCEDURE — 25810000003 SODIUM CHLORIDE PER 500 ML: Performed by: SPECIALIST

## 2023-12-12 PROCEDURE — 25810000003 LACTATED RINGERS PER 1000 ML: Performed by: SPECIALIST

## 2023-12-12 PROCEDURE — C1781 MESH (IMPLANTABLE): HCPCS | Performed by: SPECIALIST

## 2023-12-12 PROCEDURE — 25810000003 SODIUM CHLORIDE 0.9 % SOLUTION 250 ML FLEX CONT: Performed by: SPECIALIST

## 2023-12-12 PROCEDURE — 25010000002 PROPOFOL 10 MG/ML EMULSION

## 2023-12-12 DEVICE — BARD MESH
Type: IMPLANTABLE DEVICE | Site: ABDOMEN | Status: FUNCTIONAL
Brand: BARD MESH

## 2023-12-12 DEVICE — FIXATION DEVICE;30 VIOLET ABSORBABLE TACKS
Type: IMPLANTABLE DEVICE | Site: ABDOMEN | Status: FUNCTIONAL
Brand: ABSORBATACK

## 2023-12-12 RX ORDER — DROPERIDOL 2.5 MG/ML
0.62 INJECTION, SOLUTION INTRAMUSCULAR; INTRAVENOUS ONCE AS NEEDED
Status: DISCONTINUED | OUTPATIENT
Start: 2023-12-12 | End: 2023-12-12 | Stop reason: HOSPADM

## 2023-12-12 RX ORDER — IBUPROFEN 600 MG/1
600 TABLET ORAL ONCE AS NEEDED
Status: DISCONTINUED | OUTPATIENT
Start: 2023-12-12 | End: 2023-12-12 | Stop reason: HOSPADM

## 2023-12-12 RX ORDER — KETOROLAC TROMETHAMINE 30 MG/ML
INJECTION, SOLUTION INTRAMUSCULAR; INTRAVENOUS AS NEEDED
Status: DISCONTINUED | OUTPATIENT
Start: 2023-12-12 | End: 2023-12-12 | Stop reason: SURG

## 2023-12-12 RX ORDER — MAGNESIUM HYDROXIDE 1200 MG/15ML
LIQUID ORAL AS NEEDED
Status: DISCONTINUED | OUTPATIENT
Start: 2023-12-12 | End: 2023-12-12 | Stop reason: HOSPADM

## 2023-12-12 RX ORDER — PROPOFOL 10 MG/ML
VIAL (ML) INTRAVENOUS AS NEEDED
Status: DISCONTINUED | OUTPATIENT
Start: 2023-12-12 | End: 2023-12-12 | Stop reason: SURG

## 2023-12-12 RX ORDER — SODIUM CHLORIDE 9 MG/ML
INJECTION, SOLUTION INTRAVENOUS AS NEEDED
Status: DISCONTINUED | OUTPATIENT
Start: 2023-12-12 | End: 2023-12-12 | Stop reason: HOSPADM

## 2023-12-12 RX ORDER — HYDROCODONE BITARTRATE AND ACETAMINOPHEN 10; 325 MG/1; MG/1
1 TABLET ORAL EVERY 4 HOURS PRN
Status: DISCONTINUED | OUTPATIENT
Start: 2023-12-12 | End: 2023-12-12 | Stop reason: HOSPADM

## 2023-12-12 RX ORDER — ROCURONIUM BROMIDE 10 MG/ML
INJECTION, SOLUTION INTRAVENOUS AS NEEDED
Status: DISCONTINUED | OUTPATIENT
Start: 2023-12-12 | End: 2023-12-12 | Stop reason: SURG

## 2023-12-12 RX ORDER — LIDOCAINE HYDROCHLORIDE 20 MG/ML
INJECTION, SOLUTION EPIDURAL; INFILTRATION; INTRACAUDAL; PERINEURAL AS NEEDED
Status: DISCONTINUED | OUTPATIENT
Start: 2023-12-12 | End: 2023-12-12 | Stop reason: SURG

## 2023-12-12 RX ORDER — LABETALOL HYDROCHLORIDE 5 MG/ML
5 INJECTION, SOLUTION INTRAVENOUS
Status: DISCONTINUED | OUTPATIENT
Start: 2023-12-12 | End: 2023-12-12 | Stop reason: HOSPADM

## 2023-12-12 RX ORDER — HYDROCODONE BITARTRATE AND ACETAMINOPHEN 5; 325 MG/1; MG/1
1 TABLET ORAL EVERY 4 HOURS PRN
Qty: 20 TABLET | Refills: 0 | Status: SHIPPED | OUTPATIENT
Start: 2023-12-12

## 2023-12-12 RX ORDER — SODIUM CHLORIDE, SODIUM LACTATE, POTASSIUM CHLORIDE, CALCIUM CHLORIDE 600; 310; 30; 20 MG/100ML; MG/100ML; MG/100ML; MG/100ML
100 INJECTION, SOLUTION INTRAVENOUS CONTINUOUS
Status: DISCONTINUED | OUTPATIENT
Start: 2023-12-12 | End: 2023-12-12 | Stop reason: HOSPADM

## 2023-12-12 RX ORDER — SODIUM CHLORIDE 0.9 % (FLUSH) 0.9 %
3 SYRINGE (ML) INJECTION AS NEEDED
Status: DISCONTINUED | OUTPATIENT
Start: 2023-12-12 | End: 2023-12-12 | Stop reason: HOSPADM

## 2023-12-12 RX ORDER — NALOXONE HCL 0.4 MG/ML
0.4 VIAL (ML) INJECTION AS NEEDED
Status: DISCONTINUED | OUTPATIENT
Start: 2023-12-12 | End: 2023-12-12 | Stop reason: HOSPADM

## 2023-12-12 RX ORDER — NEOSTIGMINE METHYLSULFATE 5 MG/5 ML
SYRINGE (ML) INTRAVENOUS AS NEEDED
Status: DISCONTINUED | OUTPATIENT
Start: 2023-12-12 | End: 2023-12-12 | Stop reason: SURG

## 2023-12-12 RX ORDER — SODIUM CHLORIDE 9 MG/ML
40 INJECTION, SOLUTION INTRAVENOUS AS NEEDED
Status: DISCONTINUED | OUTPATIENT
Start: 2023-12-12 | End: 2023-12-12 | Stop reason: HOSPADM

## 2023-12-12 RX ORDER — SODIUM CHLORIDE, SODIUM LACTATE, POTASSIUM CHLORIDE, CALCIUM CHLORIDE 600; 310; 30; 20 MG/100ML; MG/100ML; MG/100ML; MG/100ML
1000 INJECTION, SOLUTION INTRAVENOUS CONTINUOUS
Status: DISCONTINUED | OUTPATIENT
Start: 2023-12-12 | End: 2023-12-12 | Stop reason: HOSPADM

## 2023-12-12 RX ORDER — ACETAMINOPHEN 500 MG
1000 TABLET ORAL ONCE
Status: COMPLETED | OUTPATIENT
Start: 2023-12-12 | End: 2023-12-12

## 2023-12-12 RX ORDER — KETOROLAC TROMETHAMINE 10 MG/1
10 TABLET, FILM COATED ORAL EVERY 6 HOURS PRN
Qty: 15 TABLET | Refills: 1 | Status: SHIPPED | OUTPATIENT
Start: 2023-12-12

## 2023-12-12 RX ORDER — LIDOCAINE HYDROCHLORIDE 10 MG/ML
0.5 INJECTION, SOLUTION EPIDURAL; INFILTRATION; INTRACAUDAL; PERINEURAL ONCE AS NEEDED
Status: DISCONTINUED | OUTPATIENT
Start: 2023-12-12 | End: 2023-12-12 | Stop reason: HOSPADM

## 2023-12-12 RX ORDER — FLUMAZENIL 0.1 MG/ML
0.2 INJECTION INTRAVENOUS AS NEEDED
Status: DISCONTINUED | OUTPATIENT
Start: 2023-12-12 | End: 2023-12-12 | Stop reason: HOSPADM

## 2023-12-12 RX ORDER — SODIUM CHLORIDE 0.9 % (FLUSH) 0.9 %
3 SYRINGE (ML) INJECTION EVERY 12 HOURS SCHEDULED
Status: DISCONTINUED | OUTPATIENT
Start: 2023-12-12 | End: 2023-12-12 | Stop reason: HOSPADM

## 2023-12-12 RX ORDER — FENTANYL CITRATE 50 UG/ML
25 INJECTION, SOLUTION INTRAMUSCULAR; INTRAVENOUS
Status: DISCONTINUED | OUTPATIENT
Start: 2023-12-12 | End: 2023-12-12 | Stop reason: HOSPADM

## 2023-12-12 RX ORDER — FENTANYL CITRATE 50 UG/ML
INJECTION, SOLUTION INTRAMUSCULAR; INTRAVENOUS AS NEEDED
Status: DISCONTINUED | OUTPATIENT
Start: 2023-12-12 | End: 2023-12-12 | Stop reason: SURG

## 2023-12-12 RX ORDER — DEXAMETHASONE SODIUM PHOSPHATE 4 MG/ML
INJECTION, SOLUTION INTRA-ARTICULAR; INTRALESIONAL; INTRAMUSCULAR; INTRAVENOUS; SOFT TISSUE AS NEEDED
Status: DISCONTINUED | OUTPATIENT
Start: 2023-12-12 | End: 2023-12-12 | Stop reason: SURG

## 2023-12-12 RX ORDER — SODIUM CHLORIDE 9 MG/ML
100 INJECTION, SOLUTION INTRAVENOUS CONTINUOUS
Status: DISCONTINUED | OUTPATIENT
Start: 2023-12-12 | End: 2023-12-12 | Stop reason: HOSPADM

## 2023-12-12 RX ORDER — BUPIVACAINE HYDROCHLORIDE AND EPINEPHRINE 5; 5 MG/ML; UG/ML
INJECTION, SOLUTION PERINEURAL AS NEEDED
Status: DISCONTINUED | OUTPATIENT
Start: 2023-12-12 | End: 2023-12-12 | Stop reason: HOSPADM

## 2023-12-12 RX ORDER — ONDANSETRON 2 MG/ML
4 INJECTION INTRAMUSCULAR; INTRAVENOUS ONCE AS NEEDED
Status: DISCONTINUED | OUTPATIENT
Start: 2023-12-12 | End: 2023-12-12 | Stop reason: HOSPADM

## 2023-12-12 RX ORDER — HYDROCODONE BITARTRATE AND ACETAMINOPHEN 5; 325 MG/1; MG/1
1 TABLET ORAL ONCE AS NEEDED
Status: DISCONTINUED | OUTPATIENT
Start: 2023-12-12 | End: 2023-12-12 | Stop reason: HOSPADM

## 2023-12-12 RX ORDER — ONDANSETRON 2 MG/ML
INJECTION INTRAMUSCULAR; INTRAVENOUS AS NEEDED
Status: DISCONTINUED | OUTPATIENT
Start: 2023-12-12 | End: 2023-12-12 | Stop reason: SURG

## 2023-12-12 RX ORDER — ONDANSETRON HYDROCHLORIDE 8 MG/1
4 TABLET, FILM COATED ORAL EVERY 8 HOURS PRN
Qty: 5 TABLET | Refills: 1 | Status: SHIPPED | OUTPATIENT
Start: 2023-12-12

## 2023-12-12 RX ORDER — SODIUM CHLORIDE 0.9 % (FLUSH) 0.9 %
3-10 SYRINGE (ML) INJECTION AS NEEDED
Status: DISCONTINUED | OUTPATIENT
Start: 2023-12-12 | End: 2023-12-12 | Stop reason: HOSPADM

## 2023-12-12 RX ORDER — ONDANSETRON 2 MG/ML
4 INJECTION INTRAMUSCULAR; INTRAVENOUS EVERY 6 HOURS PRN
Status: DISCONTINUED | OUTPATIENT
Start: 2023-12-12 | End: 2023-12-12 | Stop reason: HOSPADM

## 2023-12-12 RX ORDER — BUPIVACAINE HCL/0.9 % NACL/PF 0.125 %
PLASTIC BAG, INJECTION (ML) EPIDURAL AS NEEDED
Status: DISCONTINUED | OUTPATIENT
Start: 2023-12-12 | End: 2023-12-12 | Stop reason: SURG

## 2023-12-12 RX ORDER — MIDAZOLAM HYDROCHLORIDE 1 MG/ML
1 INJECTION INTRAMUSCULAR; INTRAVENOUS
Status: DISCONTINUED | OUTPATIENT
Start: 2023-12-12 | End: 2023-12-12 | Stop reason: HOSPADM

## 2023-12-12 RX ADMIN — FENTANYL CITRATE 100 MCG: 50 INJECTION, SOLUTION INTRAMUSCULAR; INTRAVENOUS at 11:05

## 2023-12-12 RX ADMIN — LIDOCAINE HYDROCHLORIDE 100 MG: 20 INJECTION, SOLUTION EPIDURAL; INFILTRATION; INTRACAUDAL; PERINEURAL at 13:15

## 2023-12-12 RX ADMIN — VANCOMYCIN HYDROCHLORIDE 1000 MG: 1 INJECTION, POWDER, LYOPHILIZED, FOR SOLUTION INTRAVENOUS at 10:21

## 2023-12-12 RX ADMIN — Medication 100 MCG: at 11:27

## 2023-12-12 RX ADMIN — ROCURONIUM BROMIDE 20 MG: 10 INJECTION, SOLUTION INTRAVENOUS at 12:36

## 2023-12-12 RX ADMIN — HYDROCODONE BITARTRATE AND ACETAMINOPHEN 1 TABLET: 10; 325 TABLET ORAL at 14:23

## 2023-12-12 RX ADMIN — LIDOCAINE HYDROCHLORIDE 100 MG: 20 INJECTION, SOLUTION EPIDURAL; INFILTRATION; INTRACAUDAL; PERINEURAL at 11:05

## 2023-12-12 RX ADMIN — FENTANYL CITRATE 50 MCG: 50 INJECTION, SOLUTION INTRAMUSCULAR; INTRAVENOUS at 11:57

## 2023-12-12 RX ADMIN — Medication 3 MG: at 13:18

## 2023-12-12 RX ADMIN — GLYCOPYRROLATE 0.4 MG: 0.2 INJECTION INTRAMUSCULAR; INTRAVENOUS at 13:18

## 2023-12-12 RX ADMIN — FENTANYL CITRATE 50 MCG: 50 INJECTION, SOLUTION INTRAMUSCULAR; INTRAVENOUS at 11:39

## 2023-12-12 RX ADMIN — PROPOFOL INJECTABLE EMULSION 200 MG: 10 INJECTION, EMULSION INTRAVENOUS at 11:05

## 2023-12-12 RX ADMIN — Medication 200 MCG: at 13:17

## 2023-12-12 RX ADMIN — ACETAMINOPHEN 1000 MG: 500 TABLET ORAL at 10:17

## 2023-12-12 RX ADMIN — ROCURONIUM BROMIDE 50 MG: 10 INJECTION, SOLUTION INTRAVENOUS at 11:05

## 2023-12-12 RX ADMIN — SODIUM CHLORIDE, POTASSIUM CHLORIDE, SODIUM LACTATE AND CALCIUM CHLORIDE: 600; 310; 30; 20 INJECTION, SOLUTION INTRAVENOUS at 13:08

## 2023-12-12 RX ADMIN — DEXAMETHASONE SODIUM PHOSPHATE 4 MG: 4 INJECTION, SOLUTION INTRA-ARTICULAR; INTRALESIONAL; INTRAMUSCULAR; INTRAVENOUS; SOFT TISSUE at 11:05

## 2023-12-12 RX ADMIN — Medication 100 MCG: at 13:15

## 2023-12-12 RX ADMIN — SODIUM CHLORIDE, POTASSIUM CHLORIDE, SODIUM LACTATE AND CALCIUM CHLORIDE 1000 ML: 600; 310; 30; 20 INJECTION, SOLUTION INTRAVENOUS at 10:17

## 2023-12-12 RX ADMIN — VANCOMYCIN HYDROCHLORIDE 1000 MG: 1 INJECTION, POWDER, LYOPHILIZED, FOR SOLUTION INTRAVENOUS at 11:21

## 2023-12-12 RX ADMIN — ONDANSETRON 4 MG: 2 INJECTION INTRAMUSCULAR; INTRAVENOUS at 13:15

## 2023-12-12 RX ADMIN — KETOROLAC TROMETHAMINE 30 MG: 30 INJECTION, SOLUTION INTRAMUSCULAR; INTRAVENOUS at 13:29

## 2023-12-12 NOTE — ANESTHESIA PREPROCEDURE EVALUATION
Anesthesia Evaluation     Patient summary reviewed and Nursing notes reviewed   history of anesthetic complications:  PONV  NPO Solid Status: > 8 hours  NPO Liquid Status: > 8 hours           Airway   Mallampati: I  No difficulty expected  Dental    (+) edentulous    Pulmonary    (+) COPD, asthma,  (-) not a smoker  Cardiovascular   Exercise tolerance: good (4-7 METS)        Neuro/Psych  (+) TIA  GI/Hepatic/Renal/Endo    (+) morbid obesity, GERD, renal disease- stones  (-) liver disease    Musculoskeletal     Abdominal   (+) obese   Substance History      OB/GYN          Other                          Anesthesia Plan    ASA 3     general     intravenous induction     Anesthetic plan, risks, benefits, and alternatives have been provided, discussed and informed consent has been obtained with: patient.      CODE STATUS:

## 2023-12-12 NOTE — ANESTHESIA PROCEDURE NOTES
Airway  Urgency: elective    Date/Time: 12/12/2023 11:06 AM  Airway not difficult    General Information and Staff    Patient location during procedure: OR  CRNA/CAA: Lamont Cochran CRNA    Indications and Patient Condition  Indications for airway management: airway protection    Preoxygenated: yes  Mask difficulty assessment: 1 - vent by mask    Final Airway Details  Final airway type: endotracheal airway      Successful airway: ETT  Cuffed: yes   Successful intubation technique: direct laryngoscopy  Facilitating devices/methods: intubating stylet  Blade: Mehrdad  Blade size: 3.5  ETT size (mm): 8.0  Cormack-Lehane Classification: grade I - full view of glottis  Placement verified by: chest auscultation and capnometry   Cuff volume (mL): 8  Measured from: lips  ETT/EBT  to lips (cm): 21  Number of attempts at approach: 1  Assessment: lips, teeth, and gum same as pre-op and atraumatic intubation    Additional Comments  Intubated by SAMANTHA Soares

## 2023-12-12 NOTE — OP NOTE
PREPERITONEAL HERNIA REPAIR LAPAROSCOPIC  Procedure Note    Riley Up  12/12/2023    Pre-op Diagnosis:   Non-recurrent bilateral inguinal hernia without obstruction or gangrene [K40.20]    Post-op Diagnosis:     Post-Op Diagnosis Codes:     * Non-recurrent bilateral inguinal hernia without obstruction or gangrene [K40.20]    Procedure/CPT® Codes:      Procedure(s):  BILATERAL PREPERITONEAL HERNIA REPAIR WITH  MESH, LAPAROSCOPIC    Surgeon(s):  David Talamantes MD  Assistant: Natasha Moon CST      Anesthesia: General    Staff:   There was no specimen    Estimated Blood Loss: Loss 50 cc    Specimens:                No specimen      Drains:   [REMOVED] Urethral Catheter Silicone 16 Fr. (Removed)       Indications: Mr. Riley Up is a 58-year-old gentleman who had increasing symptomatic cholelithiasis and had laparoscopic cholecystectomy completed in October.  He has a progressively enlarging right and left inguinal hernia mainly just a large amount of preperitoneal fat that is very large and with this noted he is referred for evaluation and treatment.  He is aware the procedure the risk and benefits and gives his informed consent for surgery.    Findings: Laparoscopic bilateral preperitoneal hernia repair accomplished, very large preperitoneal fat and cord fat going into his and the egg and direct cavity also bilateral moderate size direct inguinal hernias, large cord lipomas removed from both sides right greater than left, and preperitoneal mesh placed for repair of this modifier 22.    Complications: No complications    Procedure: Patient was placed in a supine position in the surgical suite and after a Dillard catheter had been placed, a timeout had been accomplished the area was scrubbed prepped and draped with alcohol and Betadine.  Following this and Ioban was applied a infraumbilical incision was completed incising through the skin and subcutaneous tissue to the fascia.  Once of the  fascia the fascia was opened and the rectus muscle was identified.  A retro-rectus, preperitoneal dissection was begun dissection was completed retrorectus to the pubic symphysis with the Freedom instrument and then a blunt dissection was further completed with the scope.  Following this a dissecting balloon was placed in this cavity and 35 pumps were used under direct visualization.  With the balloon in the preperitoneal area we then evacuated this and change to a structural balloon and insufflation with CO2 was completed to maintain the abdominal pressure between 10 and 14 mm.  Following this using the scope to dissect were able to place a 5 mm trocar lateral to the umbilicus lateral to the inferior epigastric vessels and then using the 5 mm trocar and a 10 mm scope were able to dissect the left side.  In the same fashion we placed a 5 mm trocar lateral to the umbilicus lateral to the inferior epigastric vessels and then with these 2  5 mm trochars and one 10 the procedure began.  Initially we dissected on the right side dissecting superior to the anterior superior iliac spine medially to the pubis inferior to the umbilicus looking down superior to the umbilicus looking down.  There is a large amount of preperitoneal fat and a large internal ring, it took over 30 minutes to dissect this large indirect inguinal hernia out it was attached at multiple small venule's, and to fully remove it and to prevent recurrence removed or ligated some of this preperitoneal fat vascular going to the scrotum.  This was ligated with 0 PDS loop both proximally and distally in this preperitoneal fat was able to be moved out from this internal ring and repair of the hernia was able to be accomplished.  There was a moderate direct inguinal hernia on either side and just a huge preperitoneal fat and cord lipoma that continued into the internal ring on either side.  The hernia in the indirect component was reduced and we stayed in a  "preperitoneal fashion.  With the right side dissected we then changed focused to the left side once again dissecting medially to the pubis laterally to the anterior superior iliac spine, inferior to the umbilicus looking down and superior to the umbilicus.  As noted above there was a very difficult preperitoneal fat and cord lipoma that was removed, also there was a moderate-sized direct inguinal hernia, and with this reduced it was then repaired with the preperitoneal mesh placement.  With this accomplished a 4-1/2 inch x 6\" piece of Marlex mesh was then cut to be placed on either side was placed and antibiotic irrigation and the left side was attacked first.  The 41/2 inch by 6 inch piece was deployed in the preperitoneal area he was affixed at the pubic symphysis and at the José's ligament ×3.  The superior margin was tagged and replaced in the inferior aspect was tucked under the peritoneal reflection.  This completed attention was then turned toward the right side once again a 4.5\" by 6 inch piece of Marlex mesh was brought into the field and it was affixed medially to the pubic symphysis medial and inferior to José's ligament and superior to the posterior rectus muscle.  With both areas deployed the inferior aspect of the mesh was then placed underneath the peritoneal reflection and under direct visualization with a grasper on the inferior aspect to prevent any movement the scope was removed and the CO2 was released.  With this accomplished excess gas was relieved trochars were removed the infraumbilical fascial defect was closed using 4 figure-of-eight sutures of 0 Vicryl the deep dermis was reapproximated using simple inverted interrupted sutures of 3-0 Vicryl and the skin was enclosed using a running subcuticular suture of 4-0 Vicryl.  Following this Mastisol, Steri-Strips, 2 x 2 and Tegaderm applied the patient was then extubated and transferred to the recovery room in good condition.          David JIMENEZ" MD [place]     Date: 12/12/2023  Time: 13:46 CST    Part of this note may be an electronic transcription/translation of spoken language to printed text using the Dragon Dictation System.

## 2023-12-12 NOTE — ANESTHESIA POSTPROCEDURE EVALUATION
Patient: Riley Up    Procedure Summary       Date: 12/12/23 Room / Location:  [facility]  [facility]    Anesthesia Start: 1100 Anesthesia Stop: 1344    Procedure: BILATERAL PREPERITONEAL HERNIA REPAIR WITH  MESH, LAPAROSCOPIC (Bilateral: Abdomen) Diagnosis:       Non-recurrent bilateral inguinal hernia without obstruction or gangrene      (Non-recurrent bilateral inguinal hernia without obstruction or gangrene [K40.20])    Surgeons: David Talamantes MD Provider: Lamont Cochran CRNA    Anesthesia Type: general ASA Status: 3            Anesthesia Type: general    Vitals  Vitals Value Taken Time   /67 12/12/23 1430   Temp 97.5 °F (36.4 °C) 12/12/23 1430   Pulse 76 12/12/23 1437   Resp 14 12/12/23 1430   SpO2 85 % 12/12/23 1437   Vitals shown include unfiled device data.        Post Anesthesia Care and Evaluation    Patient location during evaluation: PACU  Patient participation: complete - patient participated  Level of consciousness: awake and alert  Pain management: adequate    Airway patency: patent  Anesthetic complications: No anesthetic complications    Cardiovascular status: acceptable  Respiratory status: acceptable  Hydration status: acceptable    Comments: Blood pressure 111/59, pulse 78, temperature 97.5 °F (36.4 °C), temperature source Temporal, resp. rate 18, SpO2 94%.    Pt discharged from PACU based on angy score >8

## 2023-12-18 ENCOUNTER — APPOINTMENT (OUTPATIENT)
Dept: CT IMAGING | Facility: HOSPITAL | Age: 58
End: 2023-12-18
Payer: MEDICARE

## 2023-12-18 ENCOUNTER — HOSPITAL ENCOUNTER (EMERGENCY)
Facility: HOSPITAL | Age: 58
Discharge: HOME | End: 2023-12-18
Admitting: EMERGENCY MEDICINE
Payer: MEDICARE

## 2023-12-18 ENCOUNTER — APPOINTMENT (OUTPATIENT)
Dept: ULTRASOUND IMAGING | Facility: HOSPITAL | Age: 58
End: 2023-12-18
Payer: MEDICARE

## 2023-12-18 VITALS
SYSTOLIC BLOOD PRESSURE: 150 MMHG | WEIGHT: 247 LBS | BODY MASS INDEX: 41.15 KG/M2 | DIASTOLIC BLOOD PRESSURE: 76 MMHG | RESPIRATION RATE: 18 BRPM | HEART RATE: 89 BPM | TEMPERATURE: 97.6 F | OXYGEN SATURATION: 99 % | HEIGHT: 65 IN

## 2023-12-18 DIAGNOSIS — N50.89 SWOLLEN SCROTUM: Primary | ICD-10-CM

## 2023-12-18 LAB
ALBUMIN SERPL-MCNC: 4.2 G/DL (ref 3.5–5.2)
ALBUMIN/GLOB SERPL: 1.5 G/DL
ALP SERPL-CCNC: 96 U/L (ref 39–117)
ALT SERPL W P-5'-P-CCNC: 17 U/L (ref 1–41)
ANION GAP SERPL CALCULATED.3IONS-SCNC: 10 MMOL/L (ref 5–15)
AST SERPL-CCNC: 17 U/L (ref 1–40)
BASOPHILS # BLD AUTO: 0.05 10*3/MM3 (ref 0–0.2)
BASOPHILS NFR BLD AUTO: 0.6 % (ref 0–1.5)
BILIRUB SERPL-MCNC: 1.2 MG/DL (ref 0–1.2)
BILIRUB UR QL STRIP: NEGATIVE
BUN SERPL-MCNC: 11 MG/DL (ref 6–20)
BUN/CREAT SERPL: 15.9 (ref 7–25)
CALCIUM SPEC-SCNC: 9 MG/DL (ref 8.6–10.5)
CHLORIDE SERPL-SCNC: 102 MMOL/L (ref 98–107)
CLARITY UR: CLEAR
CO2 SERPL-SCNC: 31 MMOL/L (ref 22–29)
COLOR UR: YELLOW
CREAT SERPL-MCNC: 0.69 MG/DL (ref 0.76–1.27)
CRP SERPL-MCNC: 3.41 MG/DL (ref 0–0.5)
D-LACTATE SERPL-SCNC: 1.5 MMOL/L (ref 0.5–2)
DEPRECATED RDW RBC AUTO: 54 FL (ref 37–54)
EGFRCR SERPLBLD CKD-EPI 2021: 108.6 ML/MIN/1.73
EOSINOPHIL # BLD AUTO: 0.69 10*3/MM3 (ref 0–0.4)
EOSINOPHIL NFR BLD AUTO: 8.9 % (ref 0.3–6.2)
ERYTHROCYTE [DISTWIDTH] IN BLOOD BY AUTOMATED COUNT: 18 % (ref 12.3–15.4)
GLOBULIN UR ELPH-MCNC: 2.8 GM/DL
GLUCOSE SERPL-MCNC: 95 MG/DL (ref 65–99)
GLUCOSE UR STRIP-MCNC: NEGATIVE MG/DL
HCT VFR BLD AUTO: 43.4 % (ref 37.5–51)
HGB BLD-MCNC: 13.1 G/DL (ref 13–17.7)
HGB UR QL STRIP.AUTO: NEGATIVE
IMM GRANULOCYTES # BLD AUTO: 0.01 10*3/MM3 (ref 0–0.05)
IMM GRANULOCYTES NFR BLD AUTO: 0.1 % (ref 0–0.5)
KETONES UR QL STRIP: NEGATIVE
LEUKOCYTE ESTERASE UR QL STRIP.AUTO: NEGATIVE
LYMPHOCYTES # BLD AUTO: 1.37 10*3/MM3 (ref 0.7–3.1)
LYMPHOCYTES NFR BLD AUTO: 17.7 % (ref 19.6–45.3)
MCH RBC QN AUTO: 25.6 PG (ref 26.6–33)
MCHC RBC AUTO-ENTMCNC: 30.2 G/DL (ref 31.5–35.7)
MCV RBC AUTO: 84.8 FL (ref 79–97)
MONOCYTES # BLD AUTO: 0.5 10*3/MM3 (ref 0.1–0.9)
MONOCYTES NFR BLD AUTO: 6.5 % (ref 5–12)
NEUTROPHILS NFR BLD AUTO: 5.13 10*3/MM3 (ref 1.7–7)
NEUTROPHILS NFR BLD AUTO: 66.2 % (ref 42.7–76)
NITRITE UR QL STRIP: NEGATIVE
NRBC BLD AUTO-RTO: 0 /100 WBC (ref 0–0.2)
PH UR STRIP.AUTO: 7.5 [PH] (ref 5–8)
PLATELET # BLD AUTO: 395 10*3/MM3 (ref 140–450)
PMV BLD AUTO: 9.4 FL (ref 6–12)
POTASSIUM SERPL-SCNC: 3.8 MMOL/L (ref 3.5–5.2)
PROCALCITONIN SERPL-MCNC: 0.08 NG/ML (ref 0–0.25)
PROT SERPL-MCNC: 7 G/DL (ref 6–8.5)
PROT UR QL STRIP: NEGATIVE
RBC # BLD AUTO: 5.12 10*6/MM3 (ref 4.14–5.8)
SODIUM SERPL-SCNC: 143 MMOL/L (ref 136–145)
SP GR UR STRIP: >1.03 (ref 1–1.03)
UROBILINOGEN UR QL STRIP: ABNORMAL
WBC NRBC COR # BLD AUTO: 7.75 10*3/MM3 (ref 3.4–10.8)

## 2023-12-18 PROCEDURE — 87040 BLOOD CULTURE FOR BACTERIA: CPT

## 2023-12-18 PROCEDURE — 36415 COLL VENOUS BLD VENIPUNCTURE: CPT

## 2023-12-18 PROCEDURE — 80053 COMPREHEN METABOLIC PANEL: CPT

## 2023-12-18 PROCEDURE — 99285 EMERGENCY DEPT VISIT HI MDM: CPT

## 2023-12-18 PROCEDURE — 83605 ASSAY OF LACTIC ACID: CPT

## 2023-12-18 PROCEDURE — 76870 US EXAM SCROTUM: CPT

## 2023-12-18 PROCEDURE — 84145 PROCALCITONIN (PCT): CPT

## 2023-12-18 PROCEDURE — 85025 COMPLETE CBC W/AUTO DIFF WBC: CPT

## 2023-12-18 PROCEDURE — 74177 CT ABD & PELVIS W/CONTRAST: CPT

## 2023-12-18 PROCEDURE — P9612 CATHETERIZE FOR URINE SPEC: HCPCS

## 2023-12-18 PROCEDURE — 81003 URINALYSIS AUTO W/O SCOPE: CPT

## 2023-12-18 PROCEDURE — 25510000001 IOPAMIDOL 61 % SOLUTION

## 2023-12-18 PROCEDURE — 93976 VASCULAR STUDY: CPT

## 2023-12-18 PROCEDURE — 86140 C-REACTIVE PROTEIN: CPT

## 2023-12-18 RX ADMIN — IOPAMIDOL 100 ML: 612 INJECTION, SOLUTION INTRAVENOUS at 16:11

## 2023-12-18 NOTE — ED PROVIDER NOTES
Provider in Triage Note  MEDICAL SCREENING    Reason for Visit: Scrotum Swelling and abd pain post op hernia    Patient initially seen in triage.  The patient was advised further evaluation and diagnostic testing will be needed, some of the treatment and testing will be initiated in the lobby in order to begin the process.  The patient will be returned to the waiting area for the time being and will  be reassessed by a subsequent ED provider.  The patient will be brought back to the treatment area in as timely manner as possible.       Subjective   History of Present Illness  Patient is a 58-year-old male presents to ER after having a hernia repaired on Tuesday.  Patient states since then he has had increased discoloration and pain in his scrotum area.  Patient is also complaining of pain in his lower abdominal/pelvic area.  Patient denies any fever, denies any difficulty urinating.  Patient denies history of diabetes or any other immunocompromise diseases.  Patient states that he did have surgical fix bilateral inguinal hernias 1 week ago on Tuesday.  Since then the swelling has become increasingly worse.        Review of Systems   Genitourinary:  Positive for scrotal swelling and testicular pain.   All other systems reviewed and are negative.      Past Medical History:   Diagnosis Date    Arthritis     Asthma     Bronchitis     COPD (chronic obstructive pulmonary disease)     COVID     Gastric ulcer     GERD (gastroesophageal reflux disease)     Hypertension     Inguinal hernia     Kidney stone     Pneumonia     PONV (postoperative nausea and vomiting)        Allergies   Allergen Reactions    Prozac [Fluoxetine] Unknown - Low Severity     Chest pain        Past Surgical History:   Procedure Laterality Date    BICEPS TENDON REPAIR Right     CHOLECYSTECTOMY WITH INTRAOPERATIVE CHOLANGIOGRAM N/A 10/16/2023    Procedure: CHOLECYSTECTOMY LAPAROSCOPIC INTRAOPERATIVE CHOLANGIOGRAM;  Surgeon: David Talamantes MD;   Location:  [facility];  Service: General;  Laterality: N/A;    HERNIA REPAIR      KNEE ACL RECONSTRUCTION Bilateral     KNEE ARTHROSCOPY Bilateral     PREPERITONEAL HERNIA REPAIR Bilateral 12/12/2023    Procedure: BILATERAL PREPERITONEAL HERNIA REPAIR WITH  MESH, LAPAROSCOPIC;  Surgeon: David Talamantes MD;  Location:  [facility];  Service: General;  Laterality: Bilateral;    TEETH EXTRACTION      TOTAL HIP ARTHROPLASTY Bilateral     right hip repeated    TOTAL SHOULDER REPLACEMENT Right     UMBILICAL HERNIA REPAIR         History reviewed. No pertinent family history.    Social History     Socioeconomic History    Marital status:    Tobacco Use    Smoking status: Never    Smokeless tobacco: Never   Vaping Use    Vaping Use: Never used   Substance and Sexual Activity    Alcohol use: Yes     Comment: liquor daily    Drug use: Never    Sexual activity: Defer           Objective   Physical Exam  Vitals and nursing note reviewed. Chaperone present: Dr. Waledn and I completed exam together.   Constitutional:       General: He is not in acute distress.     Appearance: Normal appearance. He is not toxic-appearing or diaphoretic.   HENT:      Head: Normocephalic and atraumatic.      Right Ear: External ear normal.      Left Ear: External ear normal.      Nose: Nose normal.      Mouth/Throat:      Mouth: Mucous membranes are moist.   Eyes:      General:         Right eye: No discharge.         Left eye: No discharge.      Extraocular Movements: Extraocular movements intact.      Conjunctiva/sclera: Conjunctivae normal.   Cardiovascular:      Rate and Rhythm: Normal rate.   Pulmonary:      Effort: Pulmonary effort is normal. No respiratory distress.      Breath sounds: No rhonchi.   Abdominal:      General: Abdomen is flat.      Tenderness: There is no abdominal tenderness. There is no guarding or rebound.   Genitourinary:     Testes:         Right: Tenderness and swelling present.         Left: Tenderness and swelling  present.      Comments: Scrotum is edematous.  Darkening color tender upon palpation.  Musculoskeletal:         General: No deformity or signs of injury.      Cervical back: Normal range of motion.   Skin:     General: Skin is warm.      Coloration: Skin is not jaundiced.   Neurological:      Mental Status: He is alert and oriented to person, place, and time. Mental status is at baseline.   Psychiatric:         Mood and Affect: Mood normal.         Behavior: Behavior normal.         Thought Content: Thought content normal.         Judgment: Judgment normal.         Procedures           ED Course  ED Course as of 12/18/23 1927   Mon Dec 18, 2023   1919 Dr. Miguel Mays seen patient in the ER and stated he is cleared to follow-up with Dr. David Mays in office. [HS]      ED Course User Index  [HS] Zoey Mccabe APRN                                             Medical Decision Making  History of Present Illness  Patient is a 58-year-old male presents to ER after having a hernia repaired on Tuesday.  Patient states since then he has had increased discoloration and pain in his scrotum area.  Patient is also complaining of pain in his lower abdominal/pelvic area.  Patient denies any fever, denies any difficulty urinating.  Patient denies history of diabetes or any other immunocompromise diseases.  Patient states that he did have surgical fix bilateral inguinal hernias 1 week ago on Tuesday.  Since then the swelling has become increasingly worse.    Differential diagnosis: Barby's gangrene, cellulitis scrotum, testicular torsion, and others  Labs and imaging were ordered while in the ER.  His vital signs remained stable and patient is afebrile.   Ultrasound of the scrotum showed normal-appearing blood flow to both testicles due to size able to rule out testicular torsion.  There was some edema diffuse throughout the scrotal wall, this could be concerns for Barby's disease the patient's infection markers are  low, patient is afebrile, patient is not diabetic.  I spoke with urologist who stated he feels this is more related to hernia repair postop.  CT of the pelvis shows gas some mild edema with bilateral inguinal regions as well as abdominal wall is favored.  Urinalysis was unremarkable lactic was normal CBC was normal CRP was slightly elevated at 3.41 Pro-Melo was normal CMP was unremarkable.  Dr. Miguel Talamantes seen patient in ER and felt patient could be discharged home at this time and follow-up in office on Friday with Dr. David Talamantes.     Patient to discharge home at this time follow-up with general surgery and return back to ER if he has new or worsening symptoms.    Problems Addressed:  Swollen scrotum: complicated acute illness or injury    Amount and/or Complexity of Data Reviewed  Labs: ordered.  Radiology: ordered.    Risk  Prescription drug management.        Final diagnoses:   Swollen scrotum       ED Disposition  ED Disposition       ED Disposition   Discharge    Condition   Stable    Comment   --               Emerald Le MD  [address] [facility] [address]  [phone]    Schedule an appointment as soon as possible for a visit       David Talamantes MD  [address] [facility]   [address]  [facility] [address]  [phone]    Schedule an appointment as soon as possible for a visit       [facility] EMERGENCY DEPARTMENT  [address] [facility] [id number] [phone]    If symptoms worsen         Medication List      No changes were made to your prescriptions during this visit.            Zoey Mccabe, EDE  12/18/23 1509       Zoey Mccabe, EDE  12/18/23 1710       Zoey Mccabe, APRMARILYNN  12/18/23 1927

## 2023-12-19 NOTE — CONSULTS
Patient Care Team:  Emerald Le MD as PCP - General (Family Medicine)  David Talamantes MD as Surgeon (General Surgery)    Chief complaint postop pain    Subjective     Subjective .     History of present illness: 58-year-old male with past medical history significant for COPD and obesity who is now 6 days status post bilateral laparoscopic inguinal hernia repair with mesh (12/12/2023) with Dr. David Talamantes.    Patient presents today with 3 days of worsening lower abdominal pain and complaints of increased swelling and bruising of the scrotum.  Patient states pain began approximately 3 days after surgery.  He states that it is sharp and in a band like distribution across his lower abdomen.  He states it is similar to his preoperative pain however in a different location.  He has taken Vicodin for the pain which helps but the pain eventually returns.      He also complains of tenderness and increased swelling in his scrotum.  He states this has progressively worsened since his surgery. He denies any nausea, vomiting, changes in bowel movements, difficulty urinating, fevers, decreased appetite.    Review of Systems  Pertinent items are noted in HPI, all other systems reviewed and negative    History  Past Medical History:   Diagnosis Date    Arthritis     Asthma     Bronchitis     COPD (chronic obstructive pulmonary disease)     COVID     Gastric ulcer     GERD (gastroesophageal reflux disease)     Hypertension     Inguinal hernia     Kidney stone     Pneumonia     PONV (postoperative nausea and vomiting)    ,   Past Surgical History:   Procedure Laterality Date    BICEPS TENDON REPAIR Right     CHOLECYSTECTOMY WITH INTRAOPERATIVE CHOLANGIOGRAM N/A 10/16/2023    Procedure: CHOLECYSTECTOMY LAPAROSCOPIC INTRAOPERATIVE CHOLANGIOGRAM;  Surgeon: David Talamantes MD;  Location: [facility];  Service: General;  Laterality: N/A;    HERNIA REPAIR      KNEE ACL RECONSTRUCTION Bilateral     KNEE ARTHROSCOPY Bilateral      PREPERITONEAL HERNIA REPAIR Bilateral 12/12/2023    Procedure: BILATERAL PREPERITONEAL HERNIA REPAIR WITH  MESH, LAPAROSCOPIC;  Surgeon: David Talamantes MD;  Location: [facility];  Service: General;  Laterality: Bilateral;    TEETH EXTRACTION      TOTAL HIP ARTHROPLASTY Bilateral     right hip repeated    TOTAL SHOULDER REPLACEMENT Right     UMBILICAL HERNIA REPAIR     , History reviewed. No pertinent family history.,   Social History     Tobacco Use    Smoking status: Never    Smokeless tobacco: Never   Vaping Use    Vaping Use: Never used   Substance Use Topics    Alcohol use: Yes     Comment: liquor daily    Drug use: Never   , (Not in a hospital admission)  , Scheduled Meds:  , Continuous Infusions:  No current facility-administered medications for this encounter.  , PRN Meds:   and Allergies:  Prozac [fluoxetine]    Objective      Objective     Vital Signs   Temp:  [97.6 °F (36.4 °C)] 97.6 °F (36.4 °C)  Heart Rate:  [89] 89  Resp:  [18] 18  BP: (150)/(80) 150/80    Intake & Output (last 3 days)       None             Physical Exam:     General Appearance:    Alert, cooperative, in no acute distress   Head:    Normocephalic, without obvious abnormality, atraumatic   Eyes:            Lids and lashes normal, conjunctivae and sclerae normal, no   icterus, no pallor.   Ears:    Ears appear intact with no abnormalities noted   Neck:   Trachea midline, no JVD       Pulm Equal chest rise bilaterally.  No increased work of breathing    Heart:    Regular rhythm and normal rate   Abdomen:   Obese, soft, minimal tenderness lower abdomen to deep palpation.  Tegaderm over incision sites removed noting incisions clean dry and intact with Steri-Strips in place.  Prior cholecystectomy incisions healing well without any evidence of infection.  nondistended, no guarding, no rebound tenderness   Rectal:     Deferred   Extremities:   Moves all extremities well, no edema, no cyanosis, no             redness   Scrotum:  Ecchymotic, with pitting edema.  Minimal tenderness.  No erythema, crepitus, or exquisite tenderness to palpation.   Skin:   No bleeding, bruising or rash   Neurologic:   Cranial nerves 2 - 12 grossly intact        Results from last 7 days   Lab Units 12/18/23  1528   WBC 10*3/mm3 7.75   HEMOGLOBIN g/dL 13.1   HEMATOCRIT % 43.4   PLATELETS 10*3/mm3 395        Results from last 7 days   Lab Units 12/18/23  1528   SODIUM mmol/L 143   POTASSIUM mmol/L 3.8   CHLORIDE mmol/L 102   CO2 mmol/L 31.0*   BUN mg/dL 11   CREATININE mg/dL 0.69*   CALCIUM mg/dL 9.0   BILIRUBIN mg/dL 1.2   ALK PHOS U/L 96   ALT (SGPT) U/L 17   AST (SGOT) U/L 17   GLUCOSE mg/dL 95         Results Review:   I reviewed the patient's new clinical results.  CT abdomen pelvis with findings consistent of postoperative changes.  No evidence of drainable infected fluid collection, incisional hernia, or recurrence.  Subcutaneous air consistent with 5 days status post laparoscopic inguinal hernia repair.        Assessment/Plan     Assessment & Plan       * No active hospital problems. *      58-year-old male with history of COPD and obesity now 5 days status post bilateral laparoscopic inguinal hernia repair who presents with lower abdominal pain and scrotal swelling/erythema.  General surgery consulted due to concern for Barby's gangrene.  History, physical exam, imaging, and laboratory findings are consistent with postoperative changes.  Labs are grossly normal with white blood cell count of 7.75.  CT with findings consistent with postoperative changes as stated above.  Physical exam without any significant erythema or exquisite tenderness to palpation.    Patient was counseled on these findings and reassured.  His incisions are healing well.  Recommend patient elevate scrotum with use of rolled towel while laying down.  Patient can continue with use of hot/cold packs.  Okay for Tylenol and prescribed tramadol use.  Patient avoid NSAIDs due to past  gastric bypass. Patient counseled to return to the ER if he develops fevers chills, erythema around incision sites/groin, drainage of significant mount of fluid. Patient to follow-up in general surgery clinic on Friday 22 December with Dr. David Talamantes if pain continues.  Otherwise patient can follow-up as previously scheduled.      [x] Reviewed Records  [x] Called Physician/Consulted      I discussed the patient's findings and my recommendations with patient, nursing staff, and consulting provider    Miguel Talamantes MD  12/18/23  18:50 CST    Time: Time spent with patient 20 minutes     Part of this note may be an electronic transcription/translation of spoken language to printed text using the Dragon Dictation System.

## 2023-12-19 NOTE — DISCHARGE INSTRUCTIONS
Follow-up with general surgery as discussed today.  You do need to keep your scrotum elevated.  You can apply a scrotal sling to help with swelling.  Return back to the ER if you have new or worsening symptoms.

## 2023-12-23 LAB
BACTERIA SPEC AEROBE CULT: NORMAL
BACTERIA SPEC AEROBE CULT: NORMAL

## 2024-03-19 NOTE — PROGRESS NOTES
Office Established Patient Note:     Referring Provider: Dr. Carlito Le    Chief complaint follow-up laparoscopic bilateral inguinal hernia.  12/12/2023    Subjective .     History of present illness:  Riley Up is a 58 y.o. male  who had increasing symptomatic cholelithiasis and had laparoscopic cholecystectomy completed in October.  He has a progressively enlarging right and left inguinal hernia mainly just a large amount of preperitoneal fat that is very large and with this noted he is referred for evaluation and treatment.  He is aware the procedure the risk and benefits and gives his informed consent for surgery.     Findings: Laparoscopic bilateral preperitoneal hernia repair accomplished, very large preperitoneal fat and cord fat going into his and the egg and direct cavity also bilateral moderate size direct inguinal hernias, large cord lipomas removed from both sides right greater than left, and preperitoneal mesh placed for repair of this modifier 22.    He is here for follow-up status post laparoscopic bilateral preperitoneal hernia.  He has not had any follow-up he has canceled several appointments, he is going to the emergency room approximately 6 weeks out from surgery but no particular problems since surgery he is returning to all of his activity, he has no further pain no further discomfort in either groin where he had a very large right and left inguinal hernia.  No particular questions no problems he is happy with his repair.  He does state that he is having some problems with his hiatal hernia and Dr. Le and gastroenterology is further evaluating.    History  Past Medical History:   Diagnosis Date    Arthritis     Asthma     Bronchitis     COPD (chronic obstructive pulmonary disease)     COVID     Gastric ulcer     GERD (gastroesophageal reflux disease)     Hypertension     Inguinal hernia     Kidney stone     Pneumonia     PONV (postoperative nausea and vomiting)    ,   Past  Surgical History:   Procedure Laterality Date    BICEPS TENDON REPAIR Right     CHOLECYSTECTOMY WITH INTRAOPERATIVE CHOLANGIOGRAM N/A 10/16/2023    Procedure: CHOLECYSTECTOMY LAPAROSCOPIC INTRAOPERATIVE CHOLANGIOGRAM;  Surgeon: David Talamantes MD;  Location:  [facility] OR;  Service: General;  Laterality: N/A;    HERNIA REPAIR      KNEE ACL RECONSTRUCTION Bilateral     KNEE ARTHROSCOPY Bilateral     PREPERITONEAL HERNIA REPAIR Bilateral 12/12/2023    Procedure: BILATERAL PREPERITONEAL HERNIA REPAIR WITH  MESH, LAPAROSCOPIC;  Surgeon: David Talamantes MD;  Location:  [facility] OR;  Service: General;  Laterality: Bilateral;    TEETH EXTRACTION      TOTAL HIP ARTHROPLASTY Bilateral     right hip repeated    TOTAL SHOULDER REPLACEMENT Right     UMBILICAL HERNIA REPAIR     , No family history on file.,   Social History     Tobacco Use    Smoking status: Never    Smokeless tobacco: Never   Vaping Use    Vaping status: Never Used   Substance Use Topics    Alcohol use: Yes     Comment: liquor daily    Drug use: Never   , (Not in a hospital admission)   and Allergies:  Prozac [fluoxetine]    Current Outpatient Medications:     albuterol sulfate  (90 Base) MCG/ACT inhaler, Inhale 2 puffs Every 4 (Four) Hours As Needed for Wheezing., Disp: , Rfl:     ASPIRIN PO, Take 81 mg by mouth Daily., Disp: , Rfl:     Cyanocobalamin 1000 MCG/ML kit, Inject  as directed Take As Directed. ONE WEEKLY, Disp: , Rfl:     dicyclomine (BENTYL) 20 MG tablet, Take 1 tablet by mouth Every 8 (Eight) Hours As Needed for Abdominal Cramping. (Patient not taking: Reported on 12/6/2023), Disp: 10 tablet, Rfl: 0    fluticasone (FLOVENT HFA) 220 MCG/ACT inhaler, Inhale 2 puffs 2 (Two) Times a Day., Disp: , Rfl:     HYDROcodone-acetaminophen (NORCO) 5-325 MG per tablet, Take 1 tablet by mouth Every 4 (Four) Hours As Needed for Mild Pain for up to 20 doses., Disp: 20 tablet, Rfl: 0    ipratropium-albuterol (DUO-NEB) 0.5-2.5 mg/3 ml nebulizer, Take 3 mL by  nebulization Every 4 (Four) Hours As Needed for Wheezing (albuterol as needed in duoneb as well)., Disp: , Rfl:     ketorolac (TORADOL) 10 MG tablet, Take 1 tablet by mouth Every 6 (Six) Hours As Needed for Moderate Pain for up to 15 doses. (Patient not taking: Reported on 12/6/2023), Disp: 15 tablet, Rfl: 1    ketorolac (TORADOL) 10 MG tablet, Take 1 tablet by mouth Every 6 (Six) Hours As Needed for Moderate Pain for up to 15 doses., Disp: 15 tablet, Rfl: 1    montelukast (SINGULAIR) 10 MG tablet, Take 1 tablet by mouth Every Night., Disp: , Rfl:     ondansetron (Zofran) 8 MG tablet, Take 0.5 tablets by mouth Every 8 (Eight) Hours As Needed for Nausea or Vomiting for up to 5 doses. (Patient not taking: Reported on 12/6/2023), Disp: 5 tablet, Rfl: 1    ondansetron (Zofran) 8 MG tablet, Take 0.5 tablets by mouth Every 8 (Eight) Hours As Needed for Nausea or Vomiting for up to 5 doses., Disp: 5 tablet, Rfl: 1    raNITIdine (ZANTAC) 300 MG tablet, Take 1 tablet by mouth 2 (Two) Times a Day., Disp: , Rfl:     tiZANidine (ZANAFLEX) 4 MG tablet, Take 1 tablet by mouth Every 8 (Eight) Hours As Needed., Disp: , Rfl:     Vitamin D, Cholecalciferol, (CHOLECALCIFEROL) 10 MCG (400 UNIT) tablet, Take 1 tablet by mouth Daily., Disp: , Rfl:     Objective     Vital Signs   There were no vitals taken for this visit.     Physical Exam:  Respirations clear and equal    Cardiovascular exam regular rate and rhythm    Abdomen is obese, soft, nicely healing wound, no sign of any infection.  No further bulge, he has a hydrocele on his left unchanged, but absolutely no hernia no bulge noted right or left, and no sign of any infection.  No further pain.    Results Review:  Result Review :  Lab Results   Component Value Date    FINALDX  10/16/2023     Gallbladder, cholecystectomy:  A.  Chronic cholecystitis, mild (acalculous).  B.  No histologic evidence of malignancy.      GROSSDES  10/16/2023     1. Gallbladder.   Received in a formalin  filled container labeled with the patient's name, date of birth, and designated gallbladder.  The specimen consists of an intact gallbladder measuring 9.5 x 3.5 x 3.3 cm.  The serosal surface is blue-green, smooth and glistening.  The cystic duct appears patent.  The gallbladder wall averages 0.1 cm in thickness, the mucosa is yellow-brown and velvety.  The contents of the gallbladder wall and the specimen container are filtered and no stones are identified.  Representative sections of gallbladder wall and the cystic duct resection margin are submitted in block 1A.                Assessment & Plan     Status post laparoscopic bilateral preperitoneal hernia repair presently doing well he will be discharged to follow-up with me if questions or problems arise.  He will continue his follow-up with Dr. Heath Le, no further follow-up with me as needed.    Discussed BMI elevation and need to move toward a reduced BMI for health concerns he appears to understand he is a non smoker and his meds were reviewed.      David Talamantes MD  03/19/24  13:18 CDT

## 2024-03-20 ENCOUNTER — OFFICE VISIT (OUTPATIENT)
Dept: SURGERY | Facility: CLINIC | Age: 59
End: 2024-03-20
Payer: MEDICARE

## 2024-03-20 VITALS
BODY MASS INDEX: 41.15 KG/M2 | SYSTOLIC BLOOD PRESSURE: 147 MMHG | HEIGHT: 65 IN | HEART RATE: 101 BPM | DIASTOLIC BLOOD PRESSURE: 89 MMHG | WEIGHT: 247 LBS | OXYGEN SATURATION: 94 %

## 2024-03-20 DIAGNOSIS — K40.20 NON-RECURRENT BILATERAL INGUINAL HERNIA WITHOUT OBSTRUCTION OR GANGRENE: Primary | ICD-10-CM

## 2024-03-20 PROCEDURE — 99024 POSTOP FOLLOW-UP VISIT: CPT | Performed by: SPECIALIST

## 2024-03-20 NOTE — PATIENT INSTRUCTIONS
Thanks for coming today Mr. Up, I think everything looks great from your recent surgery of the bilateral inguinal hernia repair.  Come back and see me as you need to.  Thanks for letting me take care of your surgical problem.

## 2024-06-06 ENCOUNTER — TELEPHONE (OUTPATIENT)
Dept: CT IMAGING | Facility: HOSPITAL | Age: 59
End: 2024-06-06
Payer: MEDICARE

## 2024-06-06 NOTE — TELEPHONE ENCOUNTER
Multiple attempts by phone and mail have been made to reach patient and discuss overdue CT Chest imaging to f/u incidental lung findings. Unable to reach patient. Deactivated tracking in the [facility].

## 2024-08-01 ENCOUNTER — TRANSCRIBE ORDERS (OUTPATIENT)
Dept: ADMINISTRATIVE | Facility: HOSPITAL | Age: 59
End: 2024-08-01
Payer: MEDICARE

## 2024-08-01 DIAGNOSIS — R91.1 PULMONARY NODULE: Primary | ICD-10-CM

## 2024-10-09 ENCOUNTER — HOSPITAL ENCOUNTER (EMERGENCY)
Facility: HOSPITAL | Age: 59
Discharge: HOME | End: 2024-10-09
Attending: FAMILY MEDICINE
Payer: MEDICARE

## 2024-10-09 VITALS
HEART RATE: 95 BPM | RESPIRATION RATE: 18 BRPM | WEIGHT: 245.5 LBS | OXYGEN SATURATION: 94 % | HEIGHT: 66 IN | DIASTOLIC BLOOD PRESSURE: 109 MMHG | SYSTOLIC BLOOD PRESSURE: 153 MMHG | TEMPERATURE: 97.9 F | BODY MASS INDEX: 39.46 KG/M2

## 2024-10-09 DIAGNOSIS — S39.840A FRACTURE OF CORPUS CAVERNOSUM PENIS, INITIAL ENCOUNTER: Primary | ICD-10-CM

## 2024-10-09 PROCEDURE — 99282 EMERGENCY DEPT VISIT SF MDM: CPT

## 2024-10-09 PROCEDURE — 99284 EMERGENCY DEPT VISIT MOD MDM: CPT | Performed by: UROLOGY

## 2024-10-09 RX ORDER — ONDANSETRON 4 MG/1
4 TABLET, ORALLY DISINTEGRATING ORAL EVERY 8 HOURS PRN
Qty: 18 TABLET | Refills: 0 | Status: SHIPPED | OUTPATIENT
Start: 2024-10-09

## 2024-10-09 RX ORDER — OXYCODONE AND ACETAMINOPHEN 10; 325 MG/1; MG/1
1 TABLET ORAL EVERY 4 HOURS PRN
Qty: 18 TABLET | Refills: 0 | Status: SHIPPED | OUTPATIENT
Start: 2024-10-09

## 2024-10-09 NOTE — ED PROVIDER NOTES
Subjective   History of Present Illness  Patient states that he is having pain in his penis.  Patient states that he was having intercourse with his significant other.  He states that it popped and crunched.  Injury occurred about 1 hour ago.  He has no penile bleeding.  He denies any other symptoms at this time.          Review of Systems   All other systems reviewed and are negative.      Past Medical History:   Diagnosis Date    Arthritis     Asthma     Bronchitis     COPD (chronic obstructive pulmonary disease)     COVID     Gastric ulcer     GERD (gastroesophageal reflux disease)     Hypertension     Inguinal hernia     Kidney stone     Pneumonia     PONV (postoperative nausea and vomiting)        Allergies   Allergen Reactions    Prozac [Fluoxetine] Unknown - Low Severity     Chest pain        Past Surgical History:   Procedure Laterality Date    BICEPS TENDON REPAIR Right     CHOLECYSTECTOMY WITH INTRAOPERATIVE CHOLANGIOGRAM N/A 10/16/2023    Procedure: CHOLECYSTECTOMY LAPAROSCOPIC INTRAOPERATIVE CHOLANGIOGRAM;  Surgeon: David Talamantes MD;  Location: [facility] OR;  Service: General;  Laterality: N/A;    HERNIA REPAIR      KNEE ACL RECONSTRUCTION Bilateral     KNEE ARTHROSCOPY Bilateral     PREPERITONEAL HERNIA REPAIR Bilateral 12/12/2023    Procedure: BILATERAL PREPERITONEAL HERNIA REPAIR WITH  MESH, LAPAROSCOPIC;  Surgeon: David Talamantes MD;  Location: [facility] OR;  Service: General;  Laterality: Bilateral;    TEETH EXTRACTION      TOTAL HIP ARTHROPLASTY Bilateral     right hip repeated    TOTAL SHOULDER REPLACEMENT Right     UMBILICAL HERNIA REPAIR         History reviewed. No pertinent family history.    Social History     Socioeconomic History    Marital status:    Tobacco Use    Smoking status: Never    Smokeless tobacco: Never   Vaping Use    Vaping status: Never Used   Substance and Sexual Activity    Alcohol use: Yes     Comment: liquor daily    Drug use: Never    Sexual activity: Defer            Objective   Physical Exam  Vitals and nursing note reviewed. Exam conducted with a chaperone present.   Constitutional:       Appearance: Normal appearance.   HENT:      Head: Normocephalic and atraumatic.      Mouth/Throat:      Mouth: Mucous membranes are moist.   Eyes:      Extraocular Movements: Extraocular movements intact.      Pupils: Pupils are equal, round, and reactive to light.   Cardiovascular:      Rate and Rhythm: Normal rate and regular rhythm.      Heart sounds: Normal heart sounds.   Pulmonary:      Breath sounds: Normal breath sounds.   Genitourinary:     Penis: Circumcised. Tenderness and swelling present.    Neurological:      Mental Status: He is alert.         Procedures           ED Course                                             Medical Decision Making  59-year-old male had a traumatic injury to his penis while having sexual intercourse.  Patient had a possible penile fracture.  Patient was eval by urology here in the emergency room.  Urology offered the patient operating room.  Patient declined the surgical procedure.  Risks and benefit of that plan of care were discussed with the patient understood by patient.  Patient will follow-up with urology in 4 weeks.  Ultrasound and lab results were discontinued after urology had evaluated the patient.  Patient was advised to return to the emergency room with new or worsening symptoms.  Patient verbalized understanding was agreeable to the plan as discussed.    Problems Addressed:  Fracture of corpus cavernosum penis, initial encounter: acute illness or injury    Amount and/or Complexity of Data Reviewed  Discussion of management or test interpretation with external provider(s): Dr. Downing - Urology         Final diagnoses:   Fracture of corpus cavernosum penis, initial encounter       ED Disposition  ED Disposition       ED Disposition   Discharge    Condition   Stable    Comment   --               Emerald Le MD  [address]  [facility] [address]  [phone]    Schedule an appointment as soon as possible for a visit       [facility] EMERGENCY DEPARTMENT  [address] [place] [id number] [phone]    As needed, If symptoms worsen         Medication List        New Prescriptions      naloxone 4 MG/0.1ML nasal spray  Commonly known as: NARCAN  Call 911. Don't prime. [place] in 1 nostril for overdose. Repeat in 2-3 minutes in other nostril if no or minimal breathing/responsiveness.     ondansetron ODT 4 MG disintegrating tablet  Commonly known as: ZOFRAN-ODT  Place 1 tablet on the tongue Every 8 (Eight) Hours As Needed for Nausea or Vomiting.     oxyCODONE-acetaminophen  MG per tablet  Commonly known as: PERCOCET  Take 1 tablet by mouth Every 4 (Four) Hours As Needed for Moderate Pain.               Where to Get Your Medications        These medications were sent to [id number] - [place], [place] - [address] - [phone]  - [phone] FX  [address] [place] [facility] [address]      Phone: [phone]   naloxone 4 MG/0.1ML nasal spray  ondansetron ODT 4 MG disintegrating tablet  oxyCODONE-acetaminophen  MG per tablet            Farzad Johnson MD  10/09/24 1931

## 2024-10-09 NOTE — CONSULTS
"Referring Provider: Alex  Reason for Consultation: Possible penile fracture    Chief complaint: \"I heard a pop\"    Subjective     History of present illness:    Riley Up is a 59 year old male who presents about 2 hours after having an incident during intercourse. He reports hearing a pop, having a brief episode of pain and detumescence. He came to the ER immediately. No previous episode of this. The patient denies significant pain currently and reports there is a mild amount of swelling. He reports that his penis is usually buried within the scrotum and surrounding tissue.     Review of Systems  Pertinent items are noted in HPI, all other systems reviewed and negative     History  Past Medical History:   Diagnosis Date    Arthritis     Asthma     Bronchitis     COPD (chronic obstructive pulmonary disease)     COVID     Gastric ulcer     GERD (gastroesophageal reflux disease)     Hypertension     Inguinal hernia     Kidney stone     Pneumonia     PONV (postoperative nausea and vomiting)        Past Surgical History:   Procedure Laterality Date    BICEPS TENDON REPAIR Right     CHOLECYSTECTOMY WITH INTRAOPERATIVE CHOLANGIOGRAM N/A 10/16/2023    Procedure: CHOLECYSTECTOMY LAPAROSCOPIC INTRAOPERATIVE CHOLANGIOGRAM;  Surgeon: David Talamantes MD;  Location:  [facility] OR;  Service: General;  Laterality: N/A;    HERNIA REPAIR      KNEE ACL RECONSTRUCTION Bilateral     KNEE ARTHROSCOPY Bilateral     PREPERITONEAL HERNIA REPAIR Bilateral 12/12/2023    Procedure: BILATERAL PREPERITONEAL HERNIA REPAIR WITH  MESH, LAPAROSCOPIC;  Surgeon: David Talamantes MD;  Location:  [facility] OR;  Service: General;  Laterality: Bilateral;    TEETH EXTRACTION      TOTAL HIP ARTHROPLASTY Bilateral     right hip repeated    TOTAL SHOULDER REPLACEMENT Right     UMBILICAL HERNIA REPAIR         History reviewed. No pertinent family history.    Social History     Socioeconomic History    Marital status:    Tobacco Use    Smoking status: " Never    Smokeless tobacco: Never   Vaping Use    Vaping status: Never Used   Substance and Sexual Activity    Alcohol use: Yes     Comment: liquor daily    Drug use: Never    Sexual activity: Defer       No current facility-administered medications for this encounter.     Current Outpatient Medications   Medication Sig Dispense Refill    albuterol sulfate  (90 Base) MCG/ACT inhaler Inhale 2 puffs Every 4 (Four) Hours As Needed for Wheezing.      ASPIRIN PO Take 81 mg by mouth Daily.      Cyanocobalamin 1000 MCG/ML kit Inject  as directed Take As Directed. ONE WEEKLY      dicyclomine (BENTYL) 20 MG tablet Take 1 tablet by mouth Every 8 (Eight) Hours As Needed for Abdominal Cramping. 10 tablet 0    famotidine (PEPCID) 10 MG tablet Take 1 tablet by mouth 2 (Two) Times a Day.      fluticasone (FLOVENT HFA) 220 MCG/ACT inhaler Inhale 2 puffs 2 (Two) Times a Day.      HYDROcodone-acetaminophen (NORCO) 5-325 MG per tablet Take 1 tablet by mouth Every 4 (Four) Hours As Needed for Mild Pain for up to 20 doses. 20 tablet 0    ipratropium-albuterol (DUO-NEB) 0.5-2.5 mg/3 ml nebulizer Take 3 mL by nebulization Every 4 (Four) Hours As Needed for Wheezing (albuterol as needed in duoneb as well).      ketorolac (TORADOL) 10 MG tablet Take 1 tablet by mouth Every 6 (Six) Hours As Needed for Moderate Pain for up to 15 doses. 15 tablet 1    ketorolac (TORADOL) 10 MG tablet Take 1 tablet by mouth Every 6 (Six) Hours As Needed for Moderate Pain for up to 15 doses. 15 tablet 1    montelukast (SINGULAIR) 10 MG tablet Take 1 tablet by mouth Every Night.      ondansetron (Zofran) 8 MG tablet Take 0.5 tablets by mouth Every 8 (Eight) Hours As Needed for Nausea or Vomiting for up to 5 doses. 5 tablet 1    ondansetron (Zofran) 8 MG tablet Take 0.5 tablets by mouth Every 8 (Eight) Hours As Needed for Nausea or Vomiting for up to 5 doses. 5 tablet 1    tiZANidine (ZANAFLEX) 4 MG tablet Take 1 tablet by mouth Every 8 (Eight) Hours As  Needed.      Vitamin D, Cholecalciferol, (CHOLECALCIFEROL) 10 MCG (400 UNIT) tablet Take 1 tablet by mouth Daily.          Allergies   Allergen Reactions    Prozac [Fluoxetine] Unknown - Low Severity     Chest pain        Objective     Vital Signs   Temp:  [97.9 °F (36.6 °C)-98.1 °F (36.7 °C)] 97.9 °F (36.6 °C)  Heart Rate:  [100] 100  Resp:  [18] 18  BP: (161)/(113) 161/113  No intake or output data in the 24 hours ending 10/09/24 1850    Physical Exam:    General: Alert, cooperative, nontoxic, comfortable  Head:  Normocephalic, without obvious abnormality, atraumatic  Eyes:   Lids and lashes normal, no icterus, no pallor  Ears:  Ears appear intact with no abnormalities noted  Neck:  Supple  Back:  No costovertebral angle tenderness  Lungs: Unlabored respirations  Heart:  Regular rhythm and normal rate  Abdomen: Soft, nontender, nondistended  :  Buried penis. Mild ecchymosis and swelling on area of scrotal skin on the right covering the buried penis. No palpable fluid collection noted. Difficulty exposing the penile glans and, even more so, the distal shaft of the penis. No significant scrotal swelling noted (patient reports scrotum is normal size). I could not palpate any defect in the shaft from what I could expose.   Extremities: Moves all extremities well  Skin:  Warm and dry  Neurologic: Cranial nerves 2 - 12 grossly intact    Assessment and Plan:    Penile fracture: Discussed likely penile fracture given patient's history of the event. Discussed surgical intervention (preferred) versus observation. Discussed risk of erectile dysfunction with either course but increased risk of ED with observation. Discussed risks and benefits of surgery including bleeding, infection, difficulty identifying any injury given his buried penis, poor healing given his buried penis. The patient does not want to proceed with surgery; he wishes to observe. He understands the risk of erectile dysfunction. Recommend no sexual  activity for 4 weeks while likely injury heals.    Patient seen and counseled with Dr. Johnson present at bedside.     UROLOGICAL DISPOSITION: Will have patient follow up in 4 weeks in the office.    I discussed the patient's findings and my recommendations with patient, family, nursing staff, and Dr. Johnson    (Please note that portions of this note were completed with a voice recognition program.)    Ebenezer Downing MD  10/09/24  18:50 CDT    Time: Time spent: 30 minutes spent performing evaluation and management, chart review, and discussion with patient, > 50% of time spent in face-to-face encounter

## 2024-10-15 ENCOUNTER — TELEPHONE (OUTPATIENT)
Dept: UROLOGY | Facility: CLINIC | Age: 59
End: 2024-10-15
Payer: MEDICARE

## 2024-10-15 NOTE — TELEPHONE ENCOUNTER
----- Message from Ebenezer Downing sent at 10/9/2024  7:06 PM CDT -----  Regarding: ER follow up  Follow up with Mciheal in 4 weeks

## 2024-10-15 NOTE — TELEPHONE ENCOUNTER
I called patient to set up appointment but no answer. I left voicemail for patient to call back. We need to schedule him with Micheal in about 3 weeks.    OK for [facility] to confirm or schedule.

## 2024-10-30 ENCOUNTER — OFFICE VISIT (OUTPATIENT)
Dept: GASTROENTEROLOGY | Facility: CLINIC | Age: 59
End: 2024-10-30
Payer: MEDICARE

## 2024-10-30 VITALS
DIASTOLIC BLOOD PRESSURE: 100 MMHG | SYSTOLIC BLOOD PRESSURE: 172 MMHG | TEMPERATURE: 97.8 F | BODY MASS INDEX: 40.5 KG/M2 | HEART RATE: 96 BPM | WEIGHT: 252 LBS | HEIGHT: 66 IN | OXYGEN SATURATION: 95 %

## 2024-10-30 DIAGNOSIS — K21.9 GASTROESOPHAGEAL REFLUX DISEASE, UNSPECIFIED WHETHER ESOPHAGITIS PRESENT: Primary | ICD-10-CM

## 2024-10-30 DIAGNOSIS — R13.10 DYSPHAGIA, UNSPECIFIED TYPE: ICD-10-CM

## 2024-10-30 DIAGNOSIS — Z12.11 ENCOUNTER FOR SCREENING FOR MALIGNANT NEOPLASM OF COLON: ICD-10-CM

## 2024-10-30 PROCEDURE — 99204 OFFICE O/P NEW MOD 45 MIN: CPT | Performed by: NURSE PRACTITIONER

## 2024-10-30 PROCEDURE — 1159F MED LIST DOCD IN RCRD: CPT | Performed by: NURSE PRACTITIONER

## 2024-10-30 PROCEDURE — 1160F RVW MEDS BY RX/DR IN RCRD: CPT | Performed by: NURSE PRACTITIONER

## 2024-10-30 RX ORDER — PANTOPRAZOLE SODIUM 40 MG/1
40 TABLET, DELAYED RELEASE ORAL DAILY
COMMUNITY

## 2024-10-30 RX ORDER — FLUTICASONE PROPIONATE AND SALMETEROL 500; 50 UG/1; UG/1
POWDER RESPIRATORY (INHALATION)
COMMUNITY

## 2024-10-30 NOTE — PROGRESS NOTES
"[facility] - OFFICE NOTE    10/30/2024    Riley Up   1965    Primary Physician: Emerald Le MD    Chief Complaint   Patient presents with    Heartburn    Vomiting         HISTORY OF PRESENT ILLNESS:     Riley Up is a 59 y.o. male presents  with gerd, nausea, and vomiting.         2 years ago started with regurgitation after eating. Has occasional heartburn. Started protonix 2/2024  and helps with heartburn but not with regurgitation. The regurgitation can occur minutes after eating. Eating too close to bedtime can trigger.  He is gaining weight.  Not diabetic. He feels like solid foods get \"stuck\" and points to the lower sternal area.     No nausea. No fever. No hematemesis.         No change in bowel habits or rectal bleeding. He is interested in pursuing screening colonoscopy.       CT Abdomen Pelvis With Contrast (12/18/2023 16:14)       He has  history of gastric sleeve 2016 in [place].   Last egd was 3-4 years ago in [place] and diagnosed with mayer's esophagus.   No history of colonoscopy.   No family history of colon cancer/polyps.     Past Medical History:   Diagnosis Date    Arthritis     Asthma     Bronchitis     COPD (chronic obstructive pulmonary disease)     COVID     Gastric ulcer     GERD (gastroesophageal reflux disease)     Hypertension     Inguinal hernia     Kidney stone     Pneumonia     PONV (postoperative nausea and vomiting)        Past Surgical History:   Procedure Laterality Date    BICEPS TENDON REPAIR Right     CHOLECYSTECTOMY WITH INTRAOPERATIVE CHOLANGIOGRAM N/A 10/16/2023    Procedure: CHOLECYSTECTOMY LAPAROSCOPIC INTRAOPERATIVE CHOLANGIOGRAM;  Surgeon: David Talamantes MD;  Location: [facility];  Service: General;  Laterality: N/A;    HERNIA REPAIR      KNEE ACL RECONSTRUCTION Bilateral     KNEE ARTHROSCOPY Bilateral     PREPERITONEAL HERNIA REPAIR Bilateral 12/12/2023    Procedure: BILATERAL PREPERITONEAL HERNIA REPAIR WITH  " MESH, LAPAROSCOPIC;  Surgeon: David Talamantes MD;  Location: [facility] OR;  Service: General;  Laterality: Bilateral;    TEETH EXTRACTION      TOTAL HIP ARTHROPLASTY Bilateral     right hip repeated    TOTAL SHOULDER REPLACEMENT Right     UMBILICAL HERNIA REPAIR         Outpatient Medications Marked as Taking for the 10/30/24 encounter (Office Visit) with Debo Price APRN   Medication Sig Dispense Refill    albuterol sulfate  (90 Base) MCG/ACT inhaler Inhale 2 puffs Every 4 (Four) Hours As Needed for Wheezing.      ASPIRIN PO Take 81 mg by mouth Daily.      Cyanocobalamin 1000 MCG/ML kit Inject  as directed Take As Directed. ONE WEEKLY      Fluticasone-Salmeterol (ADVAIR/WIXELA) 500-50 MCG/ACT DISKUS Inhale 2 (Two) Times a Day.      ipratropium-albuterol (DUO-NEB) 0.5-2.5 mg/3 ml nebulizer Take 3 mL by nebulization Every 4 (Four) Hours As Needed for Wheezing (albuterol as needed in duoneb as well).      montelukast (SINGULAIR) 10 MG tablet Take 1 tablet by mouth Every Night.      ondansetron ODT (ZOFRAN-ODT) 4 MG disintegrating tablet Place 1 tablet on the tongue Every 8 (Eight) Hours As Needed for Nausea or Vomiting. 18 tablet 0    pantoprazole (PROTONIX) 40 MG EC tablet Take 1 tablet by mouth Daily.      tiZANidine (ZANAFLEX) 4 MG tablet Take 1 tablet by mouth Every 8 (Eight) Hours As Needed.      Vitamin D, Cholecalciferol, (CHOLECALCIFEROL) 10 MCG (400 UNIT) tablet Take 1 tablet by mouth Daily.         Allergies   Allergen Reactions    Prozac [Fluoxetine] Unknown - Low Severity     Chest pain        Social History     Socioeconomic History    Marital status:    Tobacco Use    Smoking status: Never    Smokeless tobacco: Never   Vaping Use    Vaping status: Never Used   Substance and Sexual Activity    Alcohol use: Yes     Comment: liquor daily    Drug use: Never    Sexual activity: Defer       Family History   Problem Relation Age of Onset    Colon cancer Neg Hx        Review of Systems  "  Constitutional:  Negative for chills, fever and unexpected weight change.   Respiratory:  Negative for shortness of breath.    Cardiovascular:  Negative for chest pain.   Gastrointestinal:  Negative for abdominal distention, abdominal pain, anal bleeding, blood in stool, constipation, diarrhea and nausea.        Vitals:    10/30/24 1314   BP: 172/100   Pulse: 96   Temp: 97.8 °F (36.6 °C)   SpO2: 95%   Weight: 114 kg (252 lb)   Height: 167.6 cm (66\")      Body mass index is 40.67 kg/m².    Physical Exam  Vitals reviewed.   Constitutional:       General: He is not in acute distress.  Cardiovascular:      Rate and Rhythm: Normal rate and regular rhythm.      Heart sounds: Normal heart sounds.   Pulmonary:      Effort: Pulmonary effort is normal.      Breath sounds: Normal breath sounds.   Abdominal:      General: Bowel sounds are normal. There is no distension.      Palpations: Abdomen is soft.      Tenderness: There is no abdominal tenderness.   Skin:     General: Skin is warm and dry.   Neurological:      Mental Status: He is alert.         Results for orders placed or performed during the hospital encounter of 12/18/23   Blood Culture - Blood, Arm, Left    Collection Time: 12/18/23  3:28 PM    Specimen: Arm, Left; Blood   Result Value Ref Range    Blood Culture No growth at 5 days    Blood Culture - Blood, Arm, Right    Collection Time: 12/18/23  3:28 PM    Specimen: Arm, Right; Blood   Result Value Ref Range    Blood Culture No growth at 5 days    Comprehensive Metabolic Panel    Collection Time: 12/18/23  3:28 PM    Specimen: Blood   Result Value Ref Range    Glucose 95 65 - 99 mg/dL    BUN 11 6 - 20 mg/dL    Creatinine 0.69 (L) 0.76 - 1.27 mg/dL    Sodium 143 136 - 145 mmol/L    Potassium 3.8 3.5 - 5.2 mmol/L    Chloride 102 98 - 107 mmol/L    CO2 31.0 (H) 22.0 - 29.0 mmol/L    Calcium 9.0 8.6 - 10.5 mg/dL    Total Protein 7.0 6.0 - 8.5 g/dL    Albumin 4.2 3.5 - 5.2 g/dL    ALT (SGPT) 17 1 - 41 U/L    AST " (SGOT) 17 1 - 40 U/L    Alkaline Phosphatase 96 39 - 117 U/L    Total Bilirubin 1.2 0.0 - 1.2 mg/dL    Globulin 2.8 gm/dL    A/G Ratio 1.5 g/dL    BUN/Creatinine Ratio 15.9 7.0 - 25.0    Anion Gap 10.0 5.0 - 15.0 mmol/L    eGFR 108.6 >60.0 mL/min/1.73   Procalcitonin    Collection Time: 12/18/23  3:28 PM    Specimen: Blood   Result Value Ref Range    Procalcitonin 0.08 0.00 - 0.25 ng/mL   Lactic Acid, Plasma    Collection Time: 12/18/23  3:28 PM    Specimen: Blood   Result Value Ref Range    Lactate 1.5 0.5 - 2.0 mmol/L   C-reactive Protein    Collection Time: 12/18/23  3:28 PM    Specimen: Blood   Result Value Ref Range    C-Reactive Protein 3.41 (H) 0.00 - 0.50 mg/dL   CBC Auto Differential    Collection Time: 12/18/23  3:28 PM    Specimen: Blood   Result Value Ref Range    WBC 7.75 3.40 - 10.80 10*3/mm3    RBC 5.12 4.14 - 5.80 10*6/mm3    Hemoglobin 13.1 13.0 - 17.7 g/dL    Hematocrit 43.4 37.5 - 51.0 %    MCV 84.8 79.0 - 97.0 fL    MCH 25.6 (L) 26.6 - 33.0 pg    MCHC 30.2 (L) 31.5 - 35.7 g/dL    RDW 18.0 (H) 12.3 - 15.4 %    RDW-SD 54.0 37.0 - 54.0 fl    MPV 9.4 6.0 - 12.0 fL    Platelets 395 140 - 450 10*3/mm3    Neutrophil % 66.2 42.7 - 76.0 %    Lymphocyte % 17.7 (L) 19.6 - 45.3 %    Monocyte % 6.5 5.0 - 12.0 %    Eosinophil % 8.9 (H) 0.3 - 6.2 %    Basophil % 0.6 0.0 - 1.5 %    Immature Grans % 0.1 0.0 - 0.5 %    Neutrophils, Absolute 5.13 1.70 - 7.00 10*3/mm3    Lymphocytes, Absolute 1.37 0.70 - 3.10 10*3/mm3    Monocytes, Absolute 0.50 0.10 - 0.90 10*3/mm3    Eosinophils, Absolute 0.69 (H) 0.00 - 0.40 10*3/mm3    Basophils, Absolute 0.05 0.00 - 0.20 10*3/mm3    Immature Grans, Absolute 0.01 0.00 - 0.05 10*3/mm3    nRBC 0.0 0.0 - 0.2 /100 WBC   Urinalysis With Culture If Indicated - Urine, Catheter    Collection Time: 12/18/23  6:26 PM    Specimen: Urine, Catheter   Result Value Ref Range    Color, UA Yellow Yellow, Straw    Appearance, UA Clear Clear    pH, UA 7.5 5.0 - 8.0    Specific Gravity, UA >1.030  (H) 1.005 - 1.030    Glucose, UA Negative Negative    Ketones, UA Negative Negative    Bilirubin, UA Negative Negative    Blood, UA Negative Negative    Protein, UA Negative Negative    Leuk Esterase, UA Negative Negative    Nitrite, UA Negative Negative    Urobilinogen, UA 1.0 E.U./dL 0.2 - 1.0 E.U./dL           ASSESSMENT AND PLAN    Assessment & Plan     Diagnoses and all orders for this visit:    1. Gastroesophageal reflux disease, unspecified whether esophagitis present (Primary)  -     Case Request; Standing    2. Dysphagia, unspecified type  -     Case Request; Standing    3. Encounter for screening for malignant neoplasm of colon  -     Case Request; Standing    Other orders  -     Implement Anesthesia Orders Day of Procedure; Standing      I recommend strict anti reflux precautions. I recommend small  meals throughout the day instead of large meals. I recommend continue ppi daily. I recommend cut food small pieces and chew well.     Schedule colonoscopy. Miralax prep.           ESOPHAGOGASTRODUODENOSCOPY WITH ANESTHESIA (N/A), COLONOSCOPY WITH ANESTHESIA (N/A)  Risk, benefits, and alternatives of endoscopy were explained in full.  They understand that there is a risk of bleeding, perforation, and infection.  The risk of perforation goes up with esophageal dilation.  Other options to evaluate UGI complaints could involve barium swallow or UGI series, but these would be diagnostic tests only.  Patient was given time to ask questions.  I answered them to their satisfaction and they are agreeable to proceeding. All risks, benefits, alternatives, and indications of colonoscopy procedure have been discussed with the patient. Risks to include perforation of the colon requiring possible surgery or colostomy, risk of bleeding from biopsies or removal of colon tissue, possibility of missing a colon polyp or cancer, or adverse drug reaction.  Benefits to include the diagnosis and management of disease of the colon and  rectum. Alternatives to include barium enema, radiographic evaluation, lab testing or no intervention. Pt verbalizes understanding and agrees.            No follow-ups on file.          There are no Patient Instructions on file for this visit.      Debo Price, APRN

## 2024-11-01 NOTE — PROGRESS NOTES
Subjective    Mr. Up is 59 y.o. male    Chief Complaint: Penile Pain    History of Present Illness  Patient presents for follow-up he presented to emergency department 10/9/2024 after hearing a pop and having brief episode of pain and detumescence in his penis.  He was seen by Dr. Guido came in to see the patient in consult.  Patient was not having any significant pain and had mild amount of swelling.  Patient reports that his penis is normally buried within the scrotum and surrounding tissue.  Patient states he is had no pain swelling or obvious bruising.  He has felt inside around his penis he is felt a slight ridge where it most likely is where the injury occurred.  Has not been sexually active since the injury has been holding off.  Dr. Downing wanted to perform surgery but patient wanted to observe.    The following portions of the patient's history were reviewed and updated as appropriate: allergies, current medications, past family history, past medical history, past social history, past surgical history and problem list.    Review of Systems   Genitourinary:         Penile injury         Current Outpatient Medications:     albuterol sulfate  (90 Base) MCG/ACT inhaler, Inhale 2 puffs Every 4 (Four) Hours As Needed for Wheezing., Disp: , Rfl:     ASPIRIN PO, Take 81 mg by mouth Daily., Disp: , Rfl:     Cyanocobalamin 1000 MCG/ML kit, Inject  as directed Take As Directed. ONE WEEKLY, Disp: , Rfl:     Fluticasone-Salmeterol (ADVAIR/WIXELA) 500-50 MCG/ACT DISKUS, Inhale 2 (Two) Times a Day., Disp: , Rfl:     ipratropium-albuterol (DUO-NEB) 0.5-2.5 mg/3 ml nebulizer, Take 3 mL by nebulization Every 4 (Four) Hours As Needed for Wheezing (albuterol as needed in duoneb as well)., Disp: , Rfl:     montelukast (SINGULAIR) 10 MG tablet, Take 1 tablet by mouth Every Night., Disp: , Rfl:     ondansetron ODT (ZOFRAN-ODT) 4 MG disintegrating tablet, Place 1 tablet on the tongue Every 8 (Eight) Hours As  Needed for Nausea or Vomiting., Disp: 18 tablet, Rfl: 0    pantoprazole (PROTONIX) 40 MG EC tablet, Take 1 tablet by mouth Daily., Disp: , Rfl:     tiZANidine (ZANAFLEX) 4 MG tablet, Take 1 tablet by mouth Every 8 (Eight) Hours As Needed., Disp: , Rfl:     Vitamin D, Cholecalciferol, (CHOLECALCIFEROL) 10 MCG (400 UNIT) tablet, Take 1 tablet by mouth Daily., Disp: , Rfl:     dicyclomine (BENTYL) 20 MG tablet, Take 1 tablet by mouth Every 8 (Eight) Hours As Needed for Abdominal Cramping. (Patient not taking: Reported on 10/30/2024), Disp: 10 tablet, Rfl: 0    famotidine (PEPCID) 10 MG tablet, Take 1 tablet by mouth 2 (Two) Times a Day. (Patient not taking: Reported on 10/30/2024), Disp: , Rfl:     fluticasone (FLOVENT HFA) 220 MCG/ACT inhaler, Inhale 2 puffs 2 (Two) Times a Day. (Patient not taking: Reported on 10/30/2024), Disp: , Rfl:     HYDROcodone-acetaminophen (NORCO) 5-325 MG per tablet, Take 1 tablet by mouth Every 4 (Four) Hours As Needed for Mild Pain for up to 20 doses., Disp: 20 tablet, Rfl: 0    ketorolac (TORADOL) 10 MG tablet, Take 1 tablet by mouth Every 6 (Six) Hours As Needed for Moderate Pain for up to 15 doses., Disp: 15 tablet, Rfl: 1    ketorolac (TORADOL) 10 MG tablet, Take 1 tablet by mouth Every 6 (Six) Hours As Needed for Moderate Pain for up to 15 doses., Disp: 15 tablet, Rfl: 1    naloxone (NARCAN) 4 MG/0.1ML nasal spray, Call 911. Don't prime. Marlow in 1 nostril for overdose. Repeat in 2-3 minutes in other nostril if no or minimal breathing/responsiveness., Disp: 2 each, Rfl: 0    ondansetron (Zofran) 8 MG tablet, Take 0.5 tablets by mouth Every 8 (Eight) Hours As Needed for Nausea or Vomiting for up to 5 doses., Disp: 5 tablet, Rfl: 1    ondansetron (Zofran) 8 MG tablet, Take 0.5 tablets by mouth Every 8 (Eight) Hours As Needed for Nausea or Vomiting for up to 5 doses., Disp: 5 tablet, Rfl: 1    oxyCODONE-acetaminophen (PERCOCET)  MG per tablet, Take 1 tablet by mouth Every 4 (Four)  "Hours As Needed for Moderate Pain., Disp: 18 tablet, Rfl: 0    Past Medical History:   Diagnosis Date    Arthritis     Asthma     Bronchitis     COPD (chronic obstructive pulmonary disease)     COVID     Gastric ulcer     GERD (gastroesophageal reflux disease)     Hypertension     Inguinal hernia     Kidney stone     Pneumonia     PONV (postoperative nausea and vomiting)        Past Surgical History:   Procedure Laterality Date    BICEPS TENDON REPAIR Right     CHOLECYSTECTOMY WITH INTRAOPERATIVE CHOLANGIOGRAM N/A 10/16/2023    Procedure: CHOLECYSTECTOMY LAPAROSCOPIC INTRAOPERATIVE CHOLANGIOGRAM;  Surgeon: David Talamantes MD;  Location:  [facility] OR;  Service: General;  Laterality: N/A;    HERNIA REPAIR      KNEE ACL RECONSTRUCTION Bilateral     KNEE ARTHROSCOPY Bilateral     PREPERITONEAL HERNIA REPAIR Bilateral 12/12/2023    Procedure: BILATERAL PREPERITONEAL HERNIA REPAIR WITH  MESH, LAPAROSCOPIC;  Surgeon: David Talamantes MD;  Location:  [facility] OR;  Service: General;  Laterality: Bilateral;    TEETH EXTRACTION      TOTAL HIP ARTHROPLASTY Bilateral     right hip repeated    TOTAL SHOULDER REPLACEMENT Right     UMBILICAL HERNIA REPAIR         Social History     Socioeconomic History    Marital status:    Tobacco Use    Smoking status: Never    Smokeless tobacco: Never   Vaping Use    Vaping status: Never Used   Substance and Sexual Activity    Alcohol use: Yes     Comment: liquor daily    Drug use: Never    Sexual activity: Defer       Family History   Problem Relation Age of Onset    Colon cancer Neg Hx        Objective    Temp 98.6 °F (37 °C) (Skin)   Resp 20   Ht 167.6 cm (65.98\")   Wt 112 kg (247 lb 9.6 oz)   BMI 39.98 kg/m²     Physical Exam  Genitourinary:     Comments: Buried penis no obvious swelling or bruising noted when penis is exposed            Results for orders placed or performed in visit on 11/12/24   POC Urinalysis Dipstick, Multipro    Collection Time: 11/12/24 10:17 AM    Specimen: " Urine   Result Value Ref Range    Color Dark Yellow Yellow, Straw, Dark Yellow, Vicki    Clarity, UA Clear Clear    Glucose, UA Negative Negative mg/dL    Bilirubin Negative Negative    Ketones, UA Negative Negative    Specific Gravity  1.025 1.005 - 1.030    Blood, UA Negative Negative    pH, Urine 7.0 5.0 - 8.0    Protein, POC 30 mg/dL (A) Negative mg/dL    Urobilinogen, UA 2.0 E.U./dL (A) Normal, 0.2 E.U./dL    Nitrite, UA Negative Negative    Leukocytes Negative Negative     Assessment and Plan    Diagnoses and all orders for this visit:    1. Penile pain (Primary)  -     POC Urinalysis Dipstick, Multipro    2. Fracture of corpus cavernosum penis, initial encounter      This occurred 10/9/2024 to this point patient has had no adverse fax.  No swelling bruising pain.  He has not been sexually active up until today.  Stated based on the length of time since the injury occurred I think it would be safe for him to attempt intercourse.    I told him to watch for development of penile curvature.    Follow-up in 3 months

## 2024-11-12 ENCOUNTER — OFFICE VISIT (OUTPATIENT)
Dept: UROLOGY | Facility: CLINIC | Age: 59
End: 2024-11-12
Payer: MEDICARE

## 2024-11-12 VITALS — HEIGHT: 66 IN | BODY MASS INDEX: 39.79 KG/M2 | RESPIRATION RATE: 20 BRPM | WEIGHT: 247.6 LBS | TEMPERATURE: 98.6 F

## 2024-11-12 DIAGNOSIS — S39.840A FRACTURE OF CORPUS CAVERNOSUM PENIS, INITIAL ENCOUNTER: ICD-10-CM

## 2024-11-12 DIAGNOSIS — N48.89 PENILE PAIN: Primary | ICD-10-CM

## 2024-11-12 LAB
BILIRUB BLD-MCNC: NEGATIVE MG/DL
CLARITY, POC: CLEAR
COLOR UR: ABNORMAL
GLUCOSE UR STRIP-MCNC: NEGATIVE MG/DL
KETONES UR QL: NEGATIVE
LEUKOCYTE EST, POC: NEGATIVE
NITRITE UR-MCNC: NEGATIVE MG/ML
PH UR: 7 [PH] (ref 5–8)
PROT UR STRIP-MCNC: ABNORMAL MG/DL
RBC # UR STRIP: NEGATIVE /UL
SP GR UR: 1.02 (ref 1–1.03)
UROBILINOGEN UR QL: ABNORMAL

## 2024-12-05 ENCOUNTER — ANESTHESIA EVENT (OUTPATIENT)
Dept: GASTROENTEROLOGY | Facility: HOSPITAL | Age: 59
End: 2024-12-05
Payer: MEDICARE

## 2024-12-05 ENCOUNTER — ANESTHESIA (OUTPATIENT)
Dept: GASTROENTEROLOGY | Facility: HOSPITAL | Age: 59
End: 2024-12-05
Payer: MEDICARE

## 2024-12-05 ENCOUNTER — TELEPHONE (OUTPATIENT)
Dept: GASTROENTEROLOGY | Facility: CLINIC | Age: 59
End: 2024-12-05
Payer: MEDICARE

## 2024-12-05 ENCOUNTER — HOSPITAL ENCOUNTER (OUTPATIENT)
Facility: HOSPITAL | Age: 59
Setting detail: HOSPITAL OUTPATIENT SURGERY
Discharge: HOME | End: 2024-12-05
Attending: INTERNAL MEDICINE | Admitting: INTERNAL MEDICINE
Payer: MEDICARE

## 2024-12-05 VITALS
RESPIRATION RATE: 14 BRPM | TEMPERATURE: 96.7 F | SYSTOLIC BLOOD PRESSURE: 114 MMHG | DIASTOLIC BLOOD PRESSURE: 77 MMHG | BODY MASS INDEX: 39.86 KG/M2 | HEIGHT: 66 IN | WEIGHT: 248 LBS | OXYGEN SATURATION: 93 % | HEART RATE: 103 BPM

## 2024-12-05 DIAGNOSIS — Z12.11 ENCOUNTER FOR SCREENING FOR MALIGNANT NEOPLASM OF COLON: ICD-10-CM

## 2024-12-05 DIAGNOSIS — R13.10 DYSPHAGIA, UNSPECIFIED TYPE: ICD-10-CM

## 2024-12-05 DIAGNOSIS — K21.9 GASTROESOPHAGEAL REFLUX DISEASE, UNSPECIFIED WHETHER ESOPHAGITIS PRESENT: ICD-10-CM

## 2024-12-05 PROCEDURE — 25010000002 PROPOFOL 10 MG/ML EMULSION: Performed by: NURSE ANESTHETIST, CERTIFIED REGISTERED

## 2024-12-05 PROCEDURE — 45385 COLONOSCOPY W/LESION REMOVAL: CPT | Performed by: INTERNAL MEDICINE

## 2024-12-05 PROCEDURE — 88305 TISSUE EXAM BY PATHOLOGIST: CPT | Performed by: INTERNAL MEDICINE

## 2024-12-05 PROCEDURE — 25010000002 LIDOCAINE PF 2% 2 % SOLUTION: Performed by: NURSE ANESTHETIST, CERTIFIED REGISTERED

## 2024-12-05 PROCEDURE — 43248 EGD GUIDE WIRE INSERTION: CPT | Performed by: INTERNAL MEDICINE

## 2024-12-05 RX ORDER — DEXMEDETOMIDINE HYDROCHLORIDE 4 UG/ML
INJECTION, SOLUTION INTRAVENOUS AS NEEDED
Status: DISCONTINUED | OUTPATIENT
Start: 2024-12-05 | End: 2024-12-05 | Stop reason: SURG

## 2024-12-05 RX ORDER — SODIUM CHLORIDE 9 MG/ML
500 INJECTION, SOLUTION INTRAVENOUS CONTINUOUS PRN
Status: DISCONTINUED | OUTPATIENT
Start: 2024-12-05 | End: 2024-12-05 | Stop reason: HOSPADM

## 2024-12-05 RX ORDER — LIDOCAINE HYDROCHLORIDE 20 MG/ML
INJECTION, SOLUTION EPIDURAL; INFILTRATION; INTRACAUDAL; PERINEURAL AS NEEDED
Status: DISCONTINUED | OUTPATIENT
Start: 2024-12-05 | End: 2024-12-05 | Stop reason: SURG

## 2024-12-05 RX ORDER — IPRATROPIUM BROMIDE AND ALBUTEROL SULFATE 2.5; .5 MG/3ML; MG/3ML
3 SOLUTION RESPIRATORY (INHALATION) ONCE
Status: COMPLETED | OUTPATIENT
Start: 2024-12-05 | End: 2024-12-05

## 2024-12-05 RX ORDER — PROPOFOL 10 MG/ML
VIAL (ML) INTRAVENOUS AS NEEDED
Status: DISCONTINUED | OUTPATIENT
Start: 2024-12-05 | End: 2024-12-05 | Stop reason: SURG

## 2024-12-05 RX ORDER — ONDANSETRON 2 MG/ML
4 INJECTION INTRAMUSCULAR; INTRAVENOUS ONCE AS NEEDED
Status: DISCONTINUED | OUTPATIENT
Start: 2024-12-05 | End: 2024-12-05 | Stop reason: HOSPADM

## 2024-12-05 RX ORDER — SODIUM CHLORIDE 0.9 % (FLUSH) 0.9 %
10 SYRINGE (ML) INJECTION AS NEEDED
Status: DISCONTINUED | OUTPATIENT
Start: 2024-12-05 | End: 2024-12-05 | Stop reason: HOSPADM

## 2024-12-05 RX ORDER — PANTOPRAZOLE SODIUM 40 MG/1
40 TABLET, DELAYED RELEASE ORAL
Qty: 60 TABLET | Refills: 11 | Status: SHIPPED | OUTPATIENT
Start: 2024-12-05

## 2024-12-05 RX ADMIN — PROPOFOL 50 MG: 10 INJECTION, EMULSION INTRAVENOUS at 12:24

## 2024-12-05 RX ADMIN — PROPOFOL 50 MG: 10 INJECTION, EMULSION INTRAVENOUS at 12:12

## 2024-12-05 RX ADMIN — Medication 10 ML: at 10:56

## 2024-12-05 RX ADMIN — IPRATROPIUM BROMIDE AND ALBUTEROL SULFATE 3 ML: 2.5; .5 SOLUTION RESPIRATORY (INHALATION) at 13:00

## 2024-12-05 RX ADMIN — LIDOCAINE HYDROCHLORIDE 200 MG: 20 INJECTION, SOLUTION EPIDURAL; INFILTRATION; INTRACAUDAL; PERINEURAL at 12:02

## 2024-12-05 RX ADMIN — PROPOFOL 100 MG: 10 INJECTION, EMULSION INTRAVENOUS at 12:02

## 2024-12-05 RX ADMIN — DEXMEDETOMIDINE HYDROCHLORIDE IN 0.9% SODIUM CHLORIDE 12 MCG: 4 INJECTION INTRAVENOUS at 12:01

## 2024-12-05 NOTE — ANESTHESIA POSTPROCEDURE EVALUATION
"Patient: Riley Up    Procedure Summary       Date: 12/05/24 Room / Location:  [facility] /  [facility]    Anesthesia Start: 1201 Anesthesia Stop: 1236    Procedures:       ESOPHAGOGASTRODUODENOSCOPY WITH ANESTHESIA      COLONOSCOPY WITH ANESTHESIA Diagnosis:       Gastroesophageal reflux disease, unspecified whether esophagitis present      Dysphagia, unspecified type      Encounter for screening for malignant neoplasm of colon      (Gastroesophageal reflux disease, unspecified whether esophagitis present [K21.9])      (Dysphagia, unspecified type [R13.10])      (Encounter for screening for malignant neoplasm of colon [Z12.11])    Surgeons: Dave Morin MD Provider: Amrik Grier CRNA    Anesthesia Type: MAC ASA Status: 3            Anesthesia Type: MAC    Vitals  Vitals Value Taken Time   /65 12/05/24 1301   Temp     Pulse 96 12/05/24 1303   Resp 13 12/05/24 1300   SpO2 100 % 12/05/24 1303   Vitals shown include unfiled device data.        Post Anesthesia Care and Evaluation    Patient location during evaluation: PHASE II  Patient participation: complete - patient participated  Level of consciousness: awake and alert  Pain management: adequate    Airway patency: patent  Anesthetic complications: No anesthetic complications  PONV Status: none  Cardiovascular status: acceptable  Respiratory status: acceptable  Hydration status: acceptable    Comments: Blood pressure 123/68, pulse 103, temperature 96.7 °F (35.9 °C), temperature source Temporal, resp. rate 13, height 167.6 cm (66\"), weight 112 kg (248 lb), SpO2 92%.    Post-procedure care plan: duoneb givenn for pt reporting tight breathing.    "

## 2024-12-05 NOTE — TELEPHONE ENCOUNTER
He was for endoscopy and colonoscopy today.  Colon showed a couple small polyps.  Endoscopy showed grade C reflux esophagitis.  He has a history of Cox's but I did not see any obvious Cox's.  He has a gastric sleeve and hiatal hernia which is wide open making him prone to the regurgitation he gets frequently.  I talked to the patient and wife about this.  I recommend twice daily PPI therapy at home with.  Strict reflux precautions.    I also suggest follow-up endoscopy in 3 months for reevaluation and see how he is responding to the therapy.  Please arrange this and send me a copy of this note at that time

## 2024-12-05 NOTE — DISCHARGE INSTRUCTIONS
DR ZUÑIGA OFFICE WILL CONTACT PT WITH APPOINTMENT DATE AND TIME FOR A REPEAT UPPER ENDOSCOPY IN 3 MONTHS TO EVALUATE THE RESPONSE TO THERAPY

## 2024-12-05 NOTE — ANESTHESIA PREPROCEDURE EVALUATION
Anesthesia Evaluation     history of anesthetic complications:  PONV  NPO Solid Status: > 8 hours  NPO Liquid Status: > 2 hours           Airway   Mallampati: II  No difficulty expected  Dental      Pulmonary    (+) COPD, asthma,  (-) no home oxygen  Cardiovascular   Exercise tolerance: good (4-7 METS)    (+) hypertension, dysrhythmias (palpitations)  (-) CAD      Neuro/Psych  (+) TIA  GI/Hepatic/Renal/Endo    (+) obesity, GERD, PUD, renal disease- stones  (-) liver disease, diabetes    Musculoskeletal     Abdominal    Substance History      OB/GYN          Other   arthritis,                 Anesthesia Plan    ASA 3     MAC     intravenous induction     Anesthetic plan, risks, benefits, and alternatives have been provided, discussed and informed consent has been obtained with: patient.    CODE STATUS:

## 2024-12-05 NOTE — ANESTHESIA POSTPROCEDURE EVALUATION
"Patient: Riley Up    Procedure Summary       Date: 12/05/24 Room / Location:  [facility]    Anesthesia Start: 1201 Anesthesia Stop: 1236    Procedures:       ESOPHAGOGASTRODUODENOSCOPY WITH ANESTHESIA      COLONOSCOPY WITH ANESTHESIA Diagnosis:       Gastroesophageal reflux disease, unspecified whether esophagitis present      Dysphagia, unspecified type      Encounter for screening for malignant neoplasm of colon      (Gastroesophageal reflux disease, unspecified whether esophagitis present [K21.9])      (Dysphagia, unspecified type [R13.10])      (Encounter for screening for malignant neoplasm of colon [Z12.11])    Surgeons: Dave Morin MD Provider: Amrik Grier CRNA    Anesthesia Type: MAC ASA Status: 3            Anesthesia Type: MAC    Vitals  Vitals Value Taken Time   /68 12/05/24 1251   Temp     Pulse 103 12/05/24 1252   Resp 18 12/05/24 1245   SpO2 92 % 12/05/24 1252   Vitals shown include unfiled device data.        Post Anesthesia Care and Evaluation    Patient location during evaluation: PACU  Patient participation: complete - patient participated  Level of consciousness: awake and alert  Pain management: adequate    Airway patency: patent  Anesthetic complications: No anesthetic complications    Cardiovascular status: acceptable  Respiratory status: acceptable  Hydration status: acceptable    Comments: Blood pressure 110/59, pulse 106, temperature 96.7 °F (35.9 °C), temperature source Temporal, resp. rate 18, height 167.6 cm (66\"), weight 112 kg (248 lb), SpO2 91%.    Pt discharged from PACU based on angy score >8    "

## 2024-12-05 NOTE — H&P
[facility] Gastroenterology  Pre Procedure History & Physical    Chief Complaint:   Screening    Subjective     HPI:   Screening he also has longstanding heartburn and regurgitation.  He has better on Protonix but still has some regurgitation.  Occasional dysphagia.  He presents for endoscopy exam    Past Medical History:   Past Medical History:   Diagnosis Date    Arthritis     Asthma     Bronchitis     COPD (chronic obstructive pulmonary disease)     COVID     Gastric ulcer     GERD (gastroesophageal reflux disease)     Hypertension     Inguinal hernia     Kidney stone     Pneumonia     PONV (postoperative nausea and vomiting)        Past Surgical History:  Past Surgical History:   Procedure Laterality Date    BICEPS TENDON REPAIR Right     CHOLECYSTECTOMY WITH INTRAOPERATIVE CHOLANGIOGRAM N/A 10/16/2023    Procedure: CHOLECYSTECTOMY LAPAROSCOPIC INTRAOPERATIVE CHOLANGIOGRAM;  Surgeon: David Talamantes MD;  Location: [facility] OR;  Service: General;  Laterality: N/A;    COLONOSCOPY      ENDOSCOPY      HERNIA REPAIR      KNEE ACL RECONSTRUCTION Bilateral     KNEE ARTHROSCOPY Bilateral     PREPERITONEAL HERNIA REPAIR Bilateral 12/12/2023    Procedure: BILATERAL PREPERITONEAL HERNIA REPAIR WITH  MESH, LAPAROSCOPIC;  Surgeon: David Talamantes MD;  Location:  [facility] OR;  Service: General;  Laterality: Bilateral;    TEETH EXTRACTION      TOTAL HIP ARTHROPLASTY Bilateral     right hip repeated    TOTAL SHOULDER REPLACEMENT Right     UMBILICAL HERNIA REPAIR         Family History:  Family History   Problem Relation Age of Onset    Colon cancer Neg Hx        Social History:   reports that he has never smoked. He has never used smokeless tobacco. He reports current alcohol use. He reports that he does not use drugs.    Medications:   Prior to Admission medications    Medication Sig Start Date End Date Taking? Authorizing Provider   albuterol sulfate  (90 Base) MCG/ACT inhaler Inhale 2 puffs Every 4 (Four) Hours As  Needed for Wheezing.   Yes Aamir Hernandes MD   Cyanocobalamin 1000 MCG/ML kit Inject  as directed Take As Directed. ONE WEEKLY   Yes Aamir Hernandes MD   Fluticasone-Salmeterol (ADVAIR/WIXELA) 500-50 MCG/ACT DISKUS Inhale 2 (Two) Times a Day.   Yes Aamir Hernandes MD   montelukast (SINGULAIR) 10 MG tablet Take 1 tablet by mouth Every Night.   Yes Aamir Hernandes MD   pantoprazole (PROTONIX) 40 MG EC tablet Take 1 tablet by mouth Daily.   Yes Aamir Hernandes MD   tiZANidine (ZANAFLEX) 4 MG tablet Take 1 tablet by mouth Every 8 (Eight) Hours As Needed. 10/13/23  Yes Aamir Hernandes MD   Vitamin D, Cholecalciferol, (CHOLECALCIFEROL) 10 MCG (400 UNIT) tablet Take 1 tablet by mouth Daily.   Yes Aamir Hernandes MD   ASPIRIN PO Take 81 mg by mouth Daily.    Aamir Hernandes MD   dicyclomine (BENTYL) 20 MG tablet Take 1 tablet by mouth Every 8 (Eight) Hours As Needed for Abdominal Cramping.  Patient not taking: Reported on 10/30/2024 9/30/23   Rey Westbrook DO   famotidine (PEPCID) 10 MG tablet Take 1 tablet by mouth 2 (Two) Times a Day.  Patient not taking: Reported on 10/30/2024    Aamir Hernandes MD   fluticasone (FLOVENT HFA) 220 MCG/ACT inhaler Inhale 2 puffs 2 (Two) Times a Day.  Patient not taking: Reported on 10/30/2024    Aamir Hernandes MD   HYDROcodone-acetaminophen (NORCO) 5-325 MG per tablet Take 1 tablet by mouth Every 4 (Four) Hours As Needed for Mild Pain for up to 20 doses. 12/12/23   David Talamantes MD   ipratropium-albuterol (DUO-NEB) 0.5-2.5 mg/3 ml nebulizer Take 3 mL by nebulization Every 4 (Four) Hours As Needed for Wheezing (albuterol as needed in duoneb as well).    Aamir Hernandes MD   ketorolac (TORADOL) 10 MG tablet Take 1 tablet by mouth Every 6 (Six) Hours As Needed for Moderate Pain for up to 15 doses. 10/16/23   David Talamantes MD   ketorolac (TORADOL) 10 MG tablet Take 1 tablet by mouth Every 6 (Six) Hours As  "Needed for Moderate Pain for up to 15 doses. 12/12/23   David Talamantes MD   naloxone (NARCAN) 4 MG/0.1ML nasal spray Call 911. Don't prime. [place] in 1 nostril for overdose. Repeat in 2-3 minutes in other nostril if no or minimal breathing/responsiveness. 10/9/24   Farzad Johnson MD   ondansetron (Zofran) 8 MG tablet Take 0.5 tablets by mouth Every 8 (Eight) Hours As Needed for Nausea or Vomiting for up to 5 doses. 10/16/23   David Talamantes MD   ondansetron (Zofran) 8 MG tablet Take 0.5 tablets by mouth Every 8 (Eight) Hours As Needed for Nausea or Vomiting for up to 5 doses. 12/12/23   David Talamantes MD   ondansetron ODT (ZOFRAN-ODT) 4 MG disintegrating tablet Place 1 tablet on the tongue Every 8 (Eight) Hours As Needed for Nausea or Vomiting. 10/9/24   Farzad Johnson MD   oxyCODONE-acetaminophen (PERCOCET)  MG per tablet Take 1 tablet by mouth Every 4 (Four) Hours As Needed for Moderate Pain. 10/9/24   Farzad Johnson MD       Allergies:  Prozac [fluoxetine]    ROS:    General: Weight stable  Resp: No SOA  Cardiovascular: No CP    Objective     Blood pressure 156/92, pulse 99, temperature 96.7 °F (35.9 °C), temperature source Temporal, resp. rate 22, height 167.6 cm (66\"), weight 112 kg (248 lb), SpO2 94%.    Physical Exam   Constitutional: Pt is oriented to person, place, and in no distress.   Cardiovascular: Normal rate, regular rhythm.    Pulmonary/Chest: Effort normal. No respiratory distress.   Abdominal: Non-distended.  Psychiatric: Mood, memory, affect and judgment appear normal.     Assessment & Plan     Diagnosis:  Screening, regurgitation, dysphagia    Anticipated Surgical Procedure:  Colonoscopy endoscopy    The risks, benefits, and alternatives of this procedure have been discussed with the patient or the responsible party- the patient understands and agrees to proceed.    EMR Dragon/transcription disclaimer:  Much of this encounter note is electronic transcription/translation of spoken " language to printed text.  The electronic translation of spoken language may be erroneous, or at times, nonsensical words or phrases may be inadvertently transcribed.  Although I have reviewed the note for such errors, some may still exist.

## 2024-12-09 ENCOUNTER — TELEPHONE (OUTPATIENT)
Dept: GASTROENTEROLOGY | Facility: CLINIC | Age: 59
End: 2024-12-09
Payer: MEDICARE

## 2024-12-09 NOTE — TELEPHONE ENCOUNTER
I called discussed his pathology results.  I was able to speak to him.  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  I told him that there is the appearance of low-grade dysplasia.  I described the steps from 1-5.  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  I informed him that his pathology results suggest that he may be up to step 3.  The pathologist sent his specimen to [place] to have a second opinion from Dr. Varela.  Will see what she thinks of it.    I informed him the importance of this is to make sure he is not getting closer to developing esophageal cancer.  He had active esophagitis so sometimes that active esophagitis can give the appearance of low-grade dysplasia and it will clear with the resolution of the esophagitis.  Thus to keep an eye on things and make sure does clear up.  Otherwise if it does not clear up he would be at increased risk for developing life-threatening esophageal carcinoma.  I strongly recommend a follow-up endoscopy in 3 months.  I advised that he keep on his twice daily PPI therapy.  He just started few days ago.  He notes a little bit of improvement but it is early on.    He expressed understanding.  He agrees with the endoscopy in 3 months.  He is going to contact the office tomorrow to schedule it.    Ankita, will you please contact him to schedule follow-up endoscopy in 3 months.  Send a copy of this note at that time.

## 2024-12-12 ENCOUNTER — TELEPHONE (OUTPATIENT)
Dept: GASTROENTEROLOGY | Facility: CLINIC | Age: 59
End: 2024-12-12
Payer: MEDICARE

## 2024-12-12 NOTE — TELEPHONE ENCOUNTER
I called to let him know that second opinion from [place] did not feel that there is any dysplastic cells present.  The path was consistent with reactive changes.  I still recommend follow-up endoscopy in 3 months.  Ankita please arrange follow-up endoscopy in 3 months and send me a copy of this note at that time.

## 2025-01-23 ENCOUNTER — PREP FOR SURGERY (OUTPATIENT)
Dept: OTHER | Facility: HOSPITAL | Age: 60
End: 2025-01-23
Payer: MEDICARE

## 2025-01-23 DIAGNOSIS — K21.9 GASTROESOPHAGEAL REFLUX DISEASE, UNSPECIFIED WHETHER ESOPHAGITIS PRESENT: Primary | ICD-10-CM

## 2025-01-31 ENCOUNTER — TRANSCRIBE ORDERS (OUTPATIENT)
Dept: ADMINISTRATIVE | Facility: HOSPITAL | Age: 60
End: 2025-01-31
Payer: MEDICARE

## 2025-01-31 ENCOUNTER — LAB (OUTPATIENT)
Dept: LAB | Facility: HOSPITAL | Age: 60
End: 2025-01-31
Payer: MEDICARE

## 2025-01-31 DIAGNOSIS — F52.21 ERECTILE DYSFUNCTION OF NONORGANIC ORIGIN: ICD-10-CM

## 2025-01-31 DIAGNOSIS — R63.5 ABNORMAL WEIGHT GAIN: Primary | ICD-10-CM

## 2025-01-31 DIAGNOSIS — R53.1 ASTHENIA: ICD-10-CM

## 2025-01-31 DIAGNOSIS — R63.5 ABNORMAL WEIGHT GAIN: ICD-10-CM

## 2025-01-31 LAB
ALBUMIN SERPL-MCNC: 4.5 G/DL (ref 3.5–5.2)
ALBUMIN/GLOB SERPL: 1.6 G/DL
ALP SERPL-CCNC: 93 U/L (ref 39–117)
ALT SERPL W P-5'-P-CCNC: 17 U/L (ref 1–41)
ANION GAP SERPL CALCULATED.3IONS-SCNC: 10 MMOL/L (ref 5–15)
AST SERPL-CCNC: 21 U/L (ref 1–40)
BASOPHILS # BLD AUTO: 0.04 10*3/MM3 (ref 0–0.2)
BASOPHILS NFR BLD AUTO: 0.5 % (ref 0–1.5)
BILIRUB SERPL-MCNC: 1 MG/DL (ref 0–1.2)
BUN SERPL-MCNC: 18 MG/DL (ref 6–20)
BUN/CREAT SERPL: 18.8 (ref 7–25)
CALCIUM SPEC-SCNC: 9.2 MG/DL (ref 8.6–10.5)
CHLORIDE SERPL-SCNC: 104 MMOL/L (ref 98–107)
CO2 SERPL-SCNC: 31 MMOL/L (ref 22–29)
CREAT SERPL-MCNC: 0.96 MG/DL (ref 0.76–1.27)
DEPRECATED RDW RBC AUTO: 48.8 FL (ref 37–54)
EGFRCR SERPLBLD CKD-EPI 2021: 92.2 ML/MIN/1.73
EOSINOPHIL # BLD AUTO: 0.22 10*3/MM3 (ref 0–0.4)
EOSINOPHIL NFR BLD AUTO: 2.9 % (ref 0.3–6.2)
ERYTHROCYTE [DISTWIDTH] IN BLOOD BY AUTOMATED COUNT: 16.8 % (ref 12.3–15.4)
GLOBULIN UR ELPH-MCNC: 2.8 GM/DL
GLUCOSE SERPL-MCNC: 103 MG/DL (ref 65–99)
HCT VFR BLD AUTO: 45.5 % (ref 37.5–51)
HGB BLD-MCNC: 13.7 G/DL (ref 13–17.7)
IMM GRANULOCYTES # BLD AUTO: 0.02 10*3/MM3 (ref 0–0.05)
IMM GRANULOCYTES NFR BLD AUTO: 0.3 % (ref 0–0.5)
LYMPHOCYTES # BLD AUTO: 1.45 10*3/MM3 (ref 0.7–3.1)
LYMPHOCYTES NFR BLD AUTO: 19.3 % (ref 19.6–45.3)
MCH RBC QN AUTO: 24.5 PG (ref 26.6–33)
MCHC RBC AUTO-ENTMCNC: 30.1 G/DL (ref 31.5–35.7)
MCV RBC AUTO: 81.4 FL (ref 79–97)
MONOCYTES # BLD AUTO: 0.63 10*3/MM3 (ref 0.1–0.9)
MONOCYTES NFR BLD AUTO: 8.4 % (ref 5–12)
NEUTROPHILS NFR BLD AUTO: 5.14 10*3/MM3 (ref 1.7–7)
NEUTROPHILS NFR BLD AUTO: 68.6 % (ref 42.7–76)
NRBC BLD AUTO-RTO: 0 /100 WBC (ref 0–0.2)
PLATELET # BLD AUTO: 309 10*3/MM3 (ref 140–450)
PMV BLD AUTO: 9.5 FL (ref 6–12)
POTASSIUM SERPL-SCNC: 4 MMOL/L (ref 3.5–5.2)
PROT SERPL-MCNC: 7.3 G/DL (ref 6–8.5)
RBC # BLD AUTO: 5.59 10*6/MM3 (ref 4.14–5.8)
SODIUM SERPL-SCNC: 145 MMOL/L (ref 136–145)
T4 FREE SERPL-MCNC: 1.26 NG/DL (ref 0.93–1.7)
TESTOST SERPL-MCNC: 282 NG/DL (ref 193–740)
TSH SERPL DL<=0.05 MIU/L-ACNC: 0.52 UIU/ML (ref 0.27–4.2)
WBC NRBC COR # BLD AUTO: 7.5 10*3/MM3 (ref 3.4–10.8)

## 2025-01-31 PROCEDURE — 85025 COMPLETE CBC W/AUTO DIFF WBC: CPT

## 2025-01-31 PROCEDURE — 84403 ASSAY OF TOTAL TESTOSTERONE: CPT

## 2025-01-31 PROCEDURE — 84439 ASSAY OF FREE THYROXINE: CPT

## 2025-01-31 PROCEDURE — 84443 ASSAY THYROID STIM HORMONE: CPT

## 2025-01-31 PROCEDURE — 80053 COMPREHEN METABOLIC PANEL: CPT

## 2025-01-31 PROCEDURE — 36415 COLL VENOUS BLD VENIPUNCTURE: CPT

## 2025-03-03 ENCOUNTER — TELEPHONE (OUTPATIENT)
Age: 60
End: 2025-03-03

## 2025-03-03 NOTE — TELEPHONE ENCOUNTER
Called pt back spoke to pt's wife ok by pt.  Caller said pt is having a lot of right hip pain that was replaced over 20 years ago from Dr. Roni Vega from [facility]  at [facility] caller said they lost all records when moving to [place] and surgeon since retired. Pt is having pain going down his leg and into groin and was and need to find someone to take over his care.  Advised caller that I would speak to Dr Kirkpatrick and see if he was willing to see him and would call pt back once Dr Kirkpatrick reviews this. Caller agreed and verbalized understanding.  Put \"waiting on records form\" in Dr Pearson folder.

## 2025-03-03 NOTE — TELEPHONE ENCOUNTER
Patient called to get appointment to see Dr Kirkpatrick, rt hip he had sx in 2001 it was a told hip replacement, lot of pain, goes down the leg, would like to make sure it is not the hip replacement,

## 2025-03-13 ENCOUNTER — TELEPHONE (OUTPATIENT)
Dept: GASTROENTEROLOGY | Facility: CLINIC | Age: 60
End: 2025-03-13
Payer: MEDICARE

## 2025-03-28 ENCOUNTER — TELEPHONE (OUTPATIENT)
Dept: GASTROENTEROLOGY | Facility: CLINIC | Age: 60
End: 2025-03-28
Payer: MEDICARE

## 2025-03-28 NOTE — TELEPHONE ENCOUNTER
Tiny from the endoscopy department informed me that he presents today for outpatient endoscopy.  He was not on our schedule.  He is on the schedule for April 21.  He told the staff that he thought he had been rescheduled for today.  He was told that we were able to pursue his endoscopy today but there were other people already here in front of him in line that were scheduled.  So there would be a wait.  He informed the staff that he did not want to wait.    Ankita can you please contact him and reschedule him for endoscopy exam.

## 2025-04-21 ENCOUNTER — TELEPHONE (OUTPATIENT)
Dept: GASTROENTEROLOGY | Facility: CLINIC | Age: 60
End: 2025-04-21
Payer: MEDICARE

## 2025-04-21 NOTE — TELEPHONE ENCOUNTER
Pts wife wife and said PT wouldn't been here today for his endo.   PT had an asthma attack this morning..

## 2025-05-12 NOTE — TELEPHONE ENCOUNTER
Spoke to patients wife, she starts a new job tomorrow.  She will call back and schedule when she knows her schedule.  Put in recall for August 2025 to follow up.

## 2025-08-28 ENCOUNTER — LAB (OUTPATIENT)
Dept: LAB | Facility: HOSPITAL | Age: 60
End: 2025-08-28
Payer: MEDICARE

## 2025-08-28 DIAGNOSIS — D51.9 ANEMIA DUE TO VITAMIN B12 DEFICIENCY, UNSPECIFIED B12 DEFICIENCY TYPE: ICD-10-CM

## 2025-08-28 DIAGNOSIS — J44.9 OBSTRUCTIVE LUNG DISEASE: ICD-10-CM

## 2025-08-28 DIAGNOSIS — J45.909: ICD-10-CM

## 2025-08-28 DIAGNOSIS — K21.00 PEPTIC REFLUX ESOPHAGITIS: ICD-10-CM

## 2025-08-28 DIAGNOSIS — E55.9 VITAMIN D DEFICIENCY: ICD-10-CM

## 2025-08-28 LAB
25(OH)D3 SERPL-MCNC: 27.4 NG/ML (ref 30–100)
ALBUMIN SERPL-MCNC: 4.2 G/DL (ref 3.5–5.2)
ALBUMIN/GLOB SERPL: 1.7 G/DL
ALP SERPL-CCNC: 85 U/L (ref 39–117)
ALT SERPL W P-5'-P-CCNC: 20 U/L (ref 1–41)
ANION GAP SERPL CALCULATED.3IONS-SCNC: 10 MMOL/L (ref 5–15)
AST SERPL-CCNC: 22 U/L (ref 1–40)
BASOPHILS # BLD AUTO: 0.06 10*3/MM3 (ref 0–0.2)
BASOPHILS NFR BLD AUTO: 0.9 % (ref 0–1.5)
BILIRUB SERPL-MCNC: 0.8 MG/DL (ref 0–1.2)
BUN SERPL-MCNC: 19.9 MG/DL (ref 6–20)
BUN/CREAT SERPL: 15.5 (ref 7–25)
CALCIUM SPEC-SCNC: 9 MG/DL (ref 8.6–10.5)
CHLORIDE SERPL-SCNC: 104 MMOL/L (ref 98–107)
CHOLEST SERPL-MCNC: 165 MG/DL (ref 0–200)
CO2 SERPL-SCNC: 32 MMOL/L (ref 22–29)
CREAT SERPL-MCNC: 1.28 MG/DL (ref 0.76–1.27)
DEPRECATED RDW RBC AUTO: 51.8 FL (ref 37–54)
EGFRCR SERPLBLD CKD-EPI 2021: 64.9 ML/MIN/1.73
EOSINOPHIL # BLD AUTO: 0.38 10*3/MM3 (ref 0–0.4)
EOSINOPHIL NFR BLD AUTO: 5.8 % (ref 0.3–6.2)
ERYTHROCYTE [DISTWIDTH] IN BLOOD BY AUTOMATED COUNT: 17.9 % (ref 12.3–15.4)
GLOBULIN UR ELPH-MCNC: 2.5 GM/DL
GLUCOSE SERPL-MCNC: 79 MG/DL (ref 65–99)
HCT VFR BLD AUTO: 43 % (ref 37.5–51)
HDLC SERPL-MCNC: 58 MG/DL (ref 40–60)
HGB BLD-MCNC: 12.7 G/DL (ref 13–17.7)
IMM GRANULOCYTES # BLD AUTO: 0.01 10*3/MM3 (ref 0–0.05)
IMM GRANULOCYTES NFR BLD AUTO: 0.2 % (ref 0–0.5)
LDLC SERPL CALC-MCNC: 93 MG/DL (ref 0–100)
LDLC/HDLC SERPL: 1.58 {RATIO}
LYMPHOCYTES # BLD AUTO: 1.5 10*3/MM3 (ref 0.7–3.1)
LYMPHOCYTES NFR BLD AUTO: 23 % (ref 19.6–45.3)
MCH RBC QN AUTO: 23.8 PG (ref 26.6–33)
MCHC RBC AUTO-ENTMCNC: 29.5 G/DL (ref 31.5–35.7)
MCV RBC AUTO: 80.7 FL (ref 79–97)
MONOCYTES # BLD AUTO: 0.66 10*3/MM3 (ref 0.1–0.9)
MONOCYTES NFR BLD AUTO: 10.1 % (ref 5–12)
NEUTROPHILS NFR BLD AUTO: 3.91 10*3/MM3 (ref 1.7–7)
NEUTROPHILS NFR BLD AUTO: 60 % (ref 42.7–76)
NRBC BLD AUTO-RTO: 0 /100 WBC (ref 0–0.2)
PLATELET # BLD AUTO: 311 10*3/MM3 (ref 140–450)
PMV BLD AUTO: 9.6 FL (ref 6–12)
POTASSIUM SERPL-SCNC: 4 MMOL/L (ref 3.5–5.2)
PROT SERPL-MCNC: 6.7 G/DL (ref 6–8.5)
RBC # BLD AUTO: 5.33 10*6/MM3 (ref 4.14–5.8)
SODIUM SERPL-SCNC: 146 MMOL/L (ref 136–145)
TRIGL SERPL-MCNC: 76 MG/DL (ref 0–150)
URATE SERPL-MCNC: 8.6 MG/DL (ref 3.4–7)
VIT B12 BLD-MCNC: 401 PG/ML (ref 211–946)
VLDLC SERPL-MCNC: 14 MG/DL (ref 5–40)
WBC NRBC COR # BLD AUTO: 6.52 10*3/MM3 (ref 3.4–10.8)

## 2025-08-28 PROCEDURE — 82607 VITAMIN B-12: CPT

## 2025-08-28 PROCEDURE — 80061 LIPID PANEL: CPT

## 2025-08-28 PROCEDURE — 82306 VITAMIN D 25 HYDROXY: CPT

## 2025-08-28 PROCEDURE — 80053 COMPREHEN METABOLIC PANEL: CPT

## 2025-08-28 PROCEDURE — 85025 COMPLETE CBC W/AUTO DIFF WBC: CPT

## 2025-08-28 PROCEDURE — 36415 COLL VENOUS BLD VENIPUNCTURE: CPT

## 2025-08-28 PROCEDURE — 84550 ASSAY OF BLOOD/URIC ACID: CPT

## (undated) DEVICE — SUT VIC 0 UR6 27IN VCP603H

## (undated) DEVICE — ST CATH CHOLANG WKARLAN/BALN 2LUM 4F 1.25

## (undated) DEVICE — TRAP FLD MINIVAC MEGADYNE 100ML

## (undated) DEVICE — THE CHANNEL CLEANING BRUSH IS A NYLON FLEXI BRUSH ATTACHED TO A FLEXIBLE PLASTIC SHEATH DESIGNED TO SAFELY REMOVE DEBRIS FROM FLEXIBLE ENDOSCOPES.

## (undated) DEVICE — SYR LUERLOK 50ML

## (undated) DEVICE — VAGINAL PREP TRAY: Brand: MEDLINE INDUSTRIES, INC.

## (undated) DEVICE — SUT VIC 4/0 P3 18IN UD VCP494H

## (undated) DEVICE — SENSR O2 OXIMAX FNGR A/ 18IN NONSTR

## (undated) DEVICE — STRUCTURAL BALLOON TROCAR: Brand: AUTO SUTURE

## (undated) DEVICE — CUFF,BP,DISP,1 TUBE,ADULT,HP: Brand: MEDLINE

## (undated) DEVICE — MASK,OXYGEN,MED CONC,ADLT,7' TUB, UC: Brand: PENDING

## (undated) DEVICE — TOTAL TRAY, 16FR 10ML SIL FOLEY, URN: Brand: MEDLINE

## (undated) DEVICE — PAD LAP CHOLE: Brand: MEDLINE INDUSTRIES, INC.

## (undated) DEVICE — KIDNEY SHAPE BALLOON: Brand: PDB

## (undated) DEVICE — GLV SURG TRIUMPH MICRO PF LTX 7.5 STRL

## (undated) DEVICE — 2, DISPOSABLE SUCTION/IRRIGATOR WITHOUT DISPOSABLE TIP: Brand: STRYKEFLOW

## (undated) DEVICE — STRIP CLS WND CURAD MEDI/STRIP HYPOALLERG 0.25X4IN PK/10

## (undated) DEVICE — THE SINGLE USE ETRAP – POLYP TRAP IS USED FOR SUCTION RETRIEVAL OF ENDOSCOPICALLY REMOVED POLYPS.: Brand: ETRAP

## (undated) DEVICE — TOWEL,OR,DSP,ST,BLUE,STD,4/PK,20PK/CS: Brand: MEDLINE

## (undated) DEVICE — FRCP BX RADJAW4 NDL 2.8 240 STD OG

## (undated) DEVICE — CLTH CLENS READYCLEANSE PERI CARE PK/5

## (undated) DEVICE — ENDOPOUCH RETRIEVER SPECIMEN RETRIEVAL BAGS: Brand: ENDOPOUCH RETRIEVER

## (undated) DEVICE — CONMED SCOPE SAVER BITE BLOCK, 20X27 MM: Brand: SCOPE SAVER

## (undated) DEVICE — SNAR POLYP CAPTIVATOR RND STFF 2.4 240CM 10MM 1P/U

## (undated) DEVICE — BAPTIST TURNOVER KIT: Brand: MEDLINE INDUSTRIES, INC.

## (undated) DEVICE — 3M™ IOBAN™ 2 ANTIMICROBIAL INCISE DRAPE 6650EZ: Brand: IOBAN™ 2

## (undated) DEVICE — SUT VIC 3/0 RB1 27IN UD VCP215H

## (undated) DEVICE — MONOPOLAR METZENBAUM SCISSOR, MINI BLADE TIP, DISPOSABLE: Brand: MONOPOLAR METZENBAUM SCISSOR, MINI BLADE TIP, DISPOSABLE

## (undated) DEVICE — ST TB EXT STANDARDBORE 30IN

## (undated) DEVICE — 4-PORT MANIFOLD: Brand: NEPTUNE 2

## (undated) DEVICE — PDS II VLT 0 107CM AG ST3: Brand: ENDOLOOP

## (undated) DEVICE — YANKAUER,BULB TIP WITH VENT: Brand: ARGYLE

## (undated) DEVICE — Device: Brand: DEFENDO AIR/WATER/SUCTION AND BIOPSY VALVE